# Patient Record
Sex: MALE | Race: WHITE | NOT HISPANIC OR LATINO
[De-identification: names, ages, dates, MRNs, and addresses within clinical notes are randomized per-mention and may not be internally consistent; named-entity substitution may affect disease eponyms.]

---

## 2017-03-06 PROBLEM — Z00.00 ENCOUNTER FOR PREVENTIVE HEALTH EXAMINATION: Status: ACTIVE | Noted: 2017-03-06

## 2017-03-13 ENCOUNTER — APPOINTMENT (OUTPATIENT)
Dept: SURGERY | Facility: CLINIC | Age: 82
End: 2017-03-13

## 2017-03-27 ENCOUNTER — APPOINTMENT (OUTPATIENT)
Dept: SURGERY | Facility: CLINIC | Age: 82
End: 2017-03-27

## 2017-04-03 ENCOUNTER — APPOINTMENT (OUTPATIENT)
Dept: SURGERY | Facility: CLINIC | Age: 82
End: 2017-04-03

## 2017-04-10 ENCOUNTER — APPOINTMENT (OUTPATIENT)
Dept: SURGERY | Facility: CLINIC | Age: 82
End: 2017-04-10

## 2017-06-07 ENCOUNTER — OUTPATIENT (OUTPATIENT)
Dept: OUTPATIENT SERVICES | Facility: HOSPITAL | Age: 82
LOS: 1 days | Discharge: HOME | End: 2017-06-07

## 2017-06-07 DIAGNOSIS — K63.1 PERFORATION OF INTESTINE (NONTRAUMATIC): ICD-10-CM

## 2017-06-07 DIAGNOSIS — R42 DIZZINESS AND GIDDINESS: ICD-10-CM

## 2017-06-07 DIAGNOSIS — K21.9 GASTRO-ESOPHAGEAL REFLUX DISEASE WITHOUT ESOPHAGITIS: ICD-10-CM

## 2017-06-07 DIAGNOSIS — N17.9 ACUTE KIDNEY FAILURE, UNSPECIFIED: ICD-10-CM

## 2017-06-07 DIAGNOSIS — N18.3 CHRONIC KIDNEY DISEASE, STAGE 3 (MODERATE): ICD-10-CM

## 2017-06-07 DIAGNOSIS — I25.10 ATHEROSCLEROTIC HEART DISEASE OF NATIVE CORONARY ARTERY WITHOUT ANGINA PECTORIS: ICD-10-CM

## 2017-06-07 DIAGNOSIS — S72.009A FRACTURE OF UNSPECIFIED PART OF NECK OF UNSPECIFIED FEMUR, INITIAL ENCOUNTER FOR CLOSED FRACTURE: ICD-10-CM

## 2017-06-07 DIAGNOSIS — I71.4 ABDOMINAL AORTIC ANEURYSM, WITHOUT RUPTURE: ICD-10-CM

## 2017-06-07 DIAGNOSIS — C61 MALIGNANT NEOPLASM OF PROSTATE: ICD-10-CM

## 2017-06-07 DIAGNOSIS — C18.9 MALIGNANT NEOPLASM OF COLON, UNSPECIFIED: ICD-10-CM
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
## 2017-06-07 DIAGNOSIS — Z93.3 COLOSTOMY STATUS: ICD-10-CM

## 2017-06-07 DIAGNOSIS — R31.0 GROSS HEMATURIA: ICD-10-CM

## 2017-06-07 DIAGNOSIS — D64.9 ANEMIA, UNSPECIFIED: ICD-10-CM

## 2017-06-07 DIAGNOSIS — K57.32 DIVERTICULITIS OF LARGE INTESTINE WITHOUT PERFORATION OR ABSCESS WITHOUT BLEEDING: ICD-10-CM

## 2017-06-28 DIAGNOSIS — N39.0 URINARY TRACT INFECTION, SITE NOT SPECIFIED: ICD-10-CM

## 2017-07-18 ENCOUNTER — OUTPATIENT (OUTPATIENT)
Dept: OUTPATIENT SERVICES | Facility: HOSPITAL | Age: 82
LOS: 1 days | Discharge: HOME | End: 2017-07-18

## 2017-07-18 DIAGNOSIS — C61 MALIGNANT NEOPLASM OF PROSTATE: ICD-10-CM

## 2017-07-18 DIAGNOSIS — E78.00 PURE HYPERCHOLESTEROLEMIA, UNSPECIFIED: ICD-10-CM

## 2017-07-18 DIAGNOSIS — E64.9 SEQUELAE OF UNSPECIFIED NUTRITIONAL DEFICIENCY: ICD-10-CM

## 2017-07-18 DIAGNOSIS — R42 DIZZINESS AND GIDDINESS: ICD-10-CM

## 2017-07-18 DIAGNOSIS — N18.4 CHRONIC KIDNEY DISEASE, STAGE 4 (SEVERE): ICD-10-CM

## 2017-07-18 DIAGNOSIS — K57.32 DIVERTICULITIS OF LARGE INTESTINE WITHOUT PERFORATION OR ABSCESS WITHOUT BLEEDING: ICD-10-CM

## 2017-07-18 DIAGNOSIS — D64.9 ANEMIA, UNSPECIFIED: ICD-10-CM

## 2017-07-18 DIAGNOSIS — K21.9 GASTRO-ESOPHAGEAL REFLUX DISEASE WITHOUT ESOPHAGITIS: ICD-10-CM

## 2017-07-18 DIAGNOSIS — K63.1 PERFORATION OF INTESTINE (NONTRAUMATIC): ICD-10-CM

## 2017-07-18 DIAGNOSIS — E83.52 HYPERCALCEMIA: ICD-10-CM

## 2017-07-18 DIAGNOSIS — N17.9 ACUTE KIDNEY FAILURE, UNSPECIFIED: ICD-10-CM

## 2017-07-18 DIAGNOSIS — I25.10 ATHEROSCLEROTIC HEART DISEASE OF NATIVE CORONARY ARTERY WITHOUT ANGINA PECTORIS: ICD-10-CM

## 2017-07-18 DIAGNOSIS — R31.0 GROSS HEMATURIA: ICD-10-CM

## 2017-07-18 DIAGNOSIS — C18.9 MALIGNANT NEOPLASM OF COLON, UNSPECIFIED: ICD-10-CM

## 2017-07-18 DIAGNOSIS — I71.4 ABDOMINAL AORTIC ANEURYSM, WITHOUT RUPTURE: ICD-10-CM

## 2017-07-18 DIAGNOSIS — N18.3 CHRONIC KIDNEY DISEASE, STAGE 3 (MODERATE): ICD-10-CM

## 2017-07-18 DIAGNOSIS — Z93.3 COLOSTOMY STATUS: ICD-10-CM

## 2017-07-18 DIAGNOSIS — S72.009A FRACTURE OF UNSPECIFIED PART OF NECK OF UNSPECIFIED FEMUR, INITIAL ENCOUNTER FOR CLOSED FRACTURE: ICD-10-CM
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
## 2017-08-31 ENCOUNTER — APPOINTMENT (OUTPATIENT)
Dept: SURGERY | Facility: CLINIC | Age: 82
End: 2017-08-31
Payer: MEDICARE

## 2017-08-31 VITALS — HEIGHT: 70 IN | WEIGHT: 125 LBS | BODY MASS INDEX: 17.9 KG/M2

## 2017-08-31 DIAGNOSIS — K43.2 INCISIONAL HERNIA W/OUT OBSTRUCTION OR GANGRENE: ICD-10-CM

## 2017-08-31 DIAGNOSIS — K63.2 FISTULA OF INTESTINE: ICD-10-CM

## 2017-08-31 DIAGNOSIS — K43.5 PARASTOMAL HERNIA WITHOUT OBSTRUCTION OR GANGRENE: ICD-10-CM

## 2017-08-31 PROCEDURE — 99214 OFFICE O/P EST MOD 30 MIN: CPT

## 2017-11-17 ENCOUNTER — INPATIENT (INPATIENT)
Facility: HOSPITAL | Age: 82
LOS: 0 days | Discharge: HOME | End: 2017-11-17
Attending: EMERGENCY MEDICINE | Admitting: FAMILY MEDICINE

## 2017-11-17 DIAGNOSIS — Z93.3 COLOSTOMY STATUS: ICD-10-CM

## 2017-11-17 DIAGNOSIS — N39.0 URINARY TRACT INFECTION, SITE NOT SPECIFIED: ICD-10-CM

## 2017-11-17 DIAGNOSIS — I71.4 ABDOMINAL AORTIC ANEURYSM, WITHOUT RUPTURE: ICD-10-CM
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
## 2017-11-17 DIAGNOSIS — N18.9 CHRONIC KIDNEY DISEASE, UNSPECIFIED: ICD-10-CM

## 2017-11-17 DIAGNOSIS — C61 MALIGNANT NEOPLASM OF PROSTATE: ICD-10-CM

## 2017-11-17 DIAGNOSIS — C18.9 MALIGNANT NEOPLASM OF COLON, UNSPECIFIED: ICD-10-CM

## 2017-11-17 DIAGNOSIS — N17.9 ACUTE KIDNEY FAILURE, UNSPECIFIED: ICD-10-CM

## 2017-11-17 DIAGNOSIS — K57.32 DIVERTICULITIS OF LARGE INTESTINE WITHOUT PERFORATION OR ABSCESS WITHOUT BLEEDING: ICD-10-CM

## 2017-11-17 DIAGNOSIS — S72.009A FRACTURE OF UNSPECIFIED PART OF NECK OF UNSPECIFIED FEMUR, INITIAL ENCOUNTER FOR CLOSED FRACTURE: ICD-10-CM

## 2017-11-17 DIAGNOSIS — R31.9 HEMATURIA, UNSPECIFIED: ICD-10-CM

## 2017-11-17 DIAGNOSIS — R42 DIZZINESS AND GIDDINESS: ICD-10-CM

## 2017-11-17 DIAGNOSIS — N18.3 CHRONIC KIDNEY DISEASE, STAGE 3 (MODERATE): ICD-10-CM

## 2017-11-17 DIAGNOSIS — R31.0 GROSS HEMATURIA: ICD-10-CM

## 2017-11-17 DIAGNOSIS — K21.9 GASTRO-ESOPHAGEAL REFLUX DISEASE WITHOUT ESOPHAGITIS: ICD-10-CM

## 2017-11-17 DIAGNOSIS — D64.9 ANEMIA, UNSPECIFIED: ICD-10-CM

## 2017-11-17 DIAGNOSIS — I25.10 ATHEROSCLEROTIC HEART DISEASE OF NATIVE CORONARY ARTERY WITHOUT ANGINA PECTORIS: ICD-10-CM

## 2017-11-17 DIAGNOSIS — K63.1 PERFORATION OF INTESTINE (NONTRAUMATIC): ICD-10-CM

## 2017-11-21 ENCOUNTER — INPATIENT (INPATIENT)
Facility: HOSPITAL | Age: 82
LOS: 7 days | Discharge: NURSING, REHAB OR LONG-TERM CARE | End: 2017-11-29
Attending: INTERNAL MEDICINE
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
## 2017-11-21 DIAGNOSIS — D64.9 ANEMIA, UNSPECIFIED: ICD-10-CM

## 2017-11-21 DIAGNOSIS — N18.3 CHRONIC KIDNEY DISEASE, STAGE 3 (MODERATE): ICD-10-CM

## 2017-11-21 DIAGNOSIS — S72.009A FRACTURE OF UNSPECIFIED PART OF NECK OF UNSPECIFIED FEMUR, INITIAL ENCOUNTER FOR CLOSED FRACTURE: ICD-10-CM

## 2017-11-21 DIAGNOSIS — C18.9 MALIGNANT NEOPLASM OF COLON, UNSPECIFIED: ICD-10-CM

## 2017-11-21 DIAGNOSIS — K63.1 PERFORATION OF INTESTINE (NONTRAUMATIC): ICD-10-CM

## 2017-11-21 DIAGNOSIS — R31.0 GROSS HEMATURIA: ICD-10-CM

## 2017-11-21 DIAGNOSIS — C61 MALIGNANT NEOPLASM OF PROSTATE: ICD-10-CM

## 2017-11-21 DIAGNOSIS — K21.9 GASTRO-ESOPHAGEAL REFLUX DISEASE WITHOUT ESOPHAGITIS: ICD-10-CM

## 2017-11-21 DIAGNOSIS — K57.32 DIVERTICULITIS OF LARGE INTESTINE WITHOUT PERFORATION OR ABSCESS WITHOUT BLEEDING: ICD-10-CM

## 2017-11-21 DIAGNOSIS — I25.10 ATHEROSCLEROTIC HEART DISEASE OF NATIVE CORONARY ARTERY WITHOUT ANGINA PECTORIS: ICD-10-CM

## 2017-11-21 DIAGNOSIS — R42 DIZZINESS AND GIDDINESS: ICD-10-CM

## 2017-11-21 DIAGNOSIS — N17.9 ACUTE KIDNEY FAILURE, UNSPECIFIED: ICD-10-CM

## 2017-11-21 DIAGNOSIS — Z93.3 COLOSTOMY STATUS: ICD-10-CM

## 2017-11-21 DIAGNOSIS — I71.4 ABDOMINAL AORTIC ANEURYSM, WITHOUT RUPTURE: ICD-10-CM

## 2017-11-30 ENCOUNTER — OUTPATIENT (OUTPATIENT)
Dept: OUTPATIENT SERVICES | Facility: HOSPITAL | Age: 82
LOS: 1 days | Discharge: HOME | End: 2017-11-30

## 2017-11-30 DIAGNOSIS — K21.9 GASTRO-ESOPHAGEAL REFLUX DISEASE WITHOUT ESOPHAGITIS: ICD-10-CM

## 2017-11-30 DIAGNOSIS — I71.4 ABDOMINAL AORTIC ANEURYSM, WITHOUT RUPTURE: ICD-10-CM

## 2017-11-30 DIAGNOSIS — I25.10 ATHEROSCLEROTIC HEART DISEASE OF NATIVE CORONARY ARTERY WITHOUT ANGINA PECTORIS: ICD-10-CM
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
## 2017-11-30 DIAGNOSIS — R42 DIZZINESS AND GIDDINESS: ICD-10-CM

## 2017-11-30 DIAGNOSIS — Z93.3 COLOSTOMY STATUS: ICD-10-CM

## 2017-11-30 DIAGNOSIS — K57.32 DIVERTICULITIS OF LARGE INTESTINE WITHOUT PERFORATION OR ABSCESS WITHOUT BLEEDING: ICD-10-CM

## 2017-11-30 DIAGNOSIS — D64.9 ANEMIA, UNSPECIFIED: ICD-10-CM

## 2017-11-30 DIAGNOSIS — N17.9 ACUTE KIDNEY FAILURE, UNSPECIFIED: ICD-10-CM

## 2017-11-30 DIAGNOSIS — C61 MALIGNANT NEOPLASM OF PROSTATE: ICD-10-CM

## 2017-11-30 DIAGNOSIS — N18.3 CHRONIC KIDNEY DISEASE, STAGE 3 (MODERATE): ICD-10-CM

## 2017-11-30 DIAGNOSIS — K63.1 PERFORATION OF INTESTINE (NONTRAUMATIC): ICD-10-CM

## 2017-11-30 DIAGNOSIS — R31.0 GROSS HEMATURIA: ICD-10-CM

## 2017-11-30 DIAGNOSIS — C18.9 MALIGNANT NEOPLASM OF COLON, UNSPECIFIED: ICD-10-CM

## 2017-11-30 DIAGNOSIS — S72.009A FRACTURE OF UNSPECIFIED PART OF NECK OF UNSPECIFIED FEMUR, INITIAL ENCOUNTER FOR CLOSED FRACTURE: ICD-10-CM

## 2017-12-01 ENCOUNTER — OUTPATIENT (OUTPATIENT)
Dept: OUTPATIENT SERVICES | Facility: HOSPITAL | Age: 82
LOS: 1 days | Discharge: HOME | End: 2017-12-01

## 2017-12-01 DIAGNOSIS — Z93.3 COLOSTOMY STATUS: ICD-10-CM

## 2017-12-01 DIAGNOSIS — R42 DIZZINESS AND GIDDINESS: ICD-10-CM

## 2017-12-01 DIAGNOSIS — C18.9 MALIGNANT NEOPLASM OF COLON, UNSPECIFIED: ICD-10-CM

## 2017-12-01 DIAGNOSIS — C61 MALIGNANT NEOPLASM OF PROSTATE: ICD-10-CM

## 2017-12-01 DIAGNOSIS — R31.0 GROSS HEMATURIA: ICD-10-CM

## 2017-12-01 DIAGNOSIS — S72.009A FRACTURE OF UNSPECIFIED PART OF NECK OF UNSPECIFIED FEMUR, INITIAL ENCOUNTER FOR CLOSED FRACTURE: ICD-10-CM

## 2017-12-01 DIAGNOSIS — R30.0 DYSURIA: ICD-10-CM

## 2017-12-01 DIAGNOSIS — N18.3 CHRONIC KIDNEY DISEASE, STAGE 3 (MODERATE): ICD-10-CM

## 2017-12-01 DIAGNOSIS — I71.4 ABDOMINAL AORTIC ANEURYSM, WITHOUT RUPTURE: ICD-10-CM
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
## 2017-12-01 DIAGNOSIS — K57.32 DIVERTICULITIS OF LARGE INTESTINE WITHOUT PERFORATION OR ABSCESS WITHOUT BLEEDING: ICD-10-CM

## 2017-12-01 DIAGNOSIS — K63.1 PERFORATION OF INTESTINE (NONTRAUMATIC): ICD-10-CM

## 2017-12-01 DIAGNOSIS — I25.10 ATHEROSCLEROTIC HEART DISEASE OF NATIVE CORONARY ARTERY WITHOUT ANGINA PECTORIS: ICD-10-CM

## 2017-12-01 DIAGNOSIS — D64.9 ANEMIA, UNSPECIFIED: ICD-10-CM

## 2017-12-01 DIAGNOSIS — N17.9 ACUTE KIDNEY FAILURE, UNSPECIFIED: ICD-10-CM

## 2017-12-01 DIAGNOSIS — K21.9 GASTRO-ESOPHAGEAL REFLUX DISEASE WITHOUT ESOPHAGITIS: ICD-10-CM

## 2017-12-04 ENCOUNTER — OUTPATIENT (OUTPATIENT)
Dept: OUTPATIENT SERVICES | Facility: HOSPITAL | Age: 82
LOS: 1 days | Discharge: HOME | End: 2017-12-04

## 2017-12-04 DIAGNOSIS — N39.0 URINARY TRACT INFECTION, SITE NOT SPECIFIED: ICD-10-CM

## 2017-12-04 DIAGNOSIS — D64.9 ANEMIA, UNSPECIFIED: ICD-10-CM

## 2017-12-04 DIAGNOSIS — R31.0 GROSS HEMATURIA: ICD-10-CM

## 2017-12-04 DIAGNOSIS — Z86.718 PERSONAL HISTORY OF OTHER VENOUS THROMBOSIS AND EMBOLISM: ICD-10-CM

## 2017-12-04 DIAGNOSIS — K57.32 DIVERTICULITIS OF LARGE INTESTINE WITHOUT PERFORATION OR ABSCESS WITHOUT BLEEDING: ICD-10-CM

## 2017-12-04 DIAGNOSIS — N17.9 ACUTE KIDNEY FAILURE, UNSPECIFIED: ICD-10-CM

## 2017-12-04 DIAGNOSIS — R42 DIZZINESS AND GIDDINESS: ICD-10-CM

## 2017-12-04 DIAGNOSIS — N18.4 CHRONIC KIDNEY DISEASE, STAGE 4 (SEVERE): ICD-10-CM

## 2017-12-04 DIAGNOSIS — I25.10 ATHEROSCLEROTIC HEART DISEASE OF NATIVE CORONARY ARTERY WITHOUT ANGINA PECTORIS: ICD-10-CM

## 2017-12-04 DIAGNOSIS — Z93.3 COLOSTOMY STATUS: ICD-10-CM

## 2017-12-04 DIAGNOSIS — N40.1 BENIGN PROSTATIC HYPERPLASIA WITH LOWER URINARY TRACT SYMPTOMS: ICD-10-CM

## 2017-12-04 DIAGNOSIS — S72.009A FRACTURE OF UNSPECIFIED PART OF NECK OF UNSPECIFIED FEMUR, INITIAL ENCOUNTER FOR CLOSED FRACTURE: ICD-10-CM

## 2017-12-04 DIAGNOSIS — E87.1 HYPO-OSMOLALITY AND HYPONATREMIA: ICD-10-CM

## 2017-12-04 DIAGNOSIS — Z85.46 PERSONAL HISTORY OF MALIGNANT NEOPLASM OF PROSTATE: ICD-10-CM

## 2017-12-04 DIAGNOSIS — R79.89 OTHER SPECIFIED ABNORMAL FINDINGS OF BLOOD CHEMISTRY: ICD-10-CM

## 2017-12-04 DIAGNOSIS — N18.3 CHRONIC KIDNEY DISEASE, STAGE 3 (MODERATE): ICD-10-CM

## 2017-12-04 DIAGNOSIS — K21.9 GASTRO-ESOPHAGEAL REFLUX DISEASE WITHOUT ESOPHAGITIS: ICD-10-CM

## 2017-12-04 DIAGNOSIS — I12.9 HYPERTENSIVE CHRONIC KIDNEY DISEASE WITH STAGE 1 THROUGH STAGE 4 CHRONIC KIDNEY DISEASE, OR UNSPECIFIED CHRONIC KIDNEY DISEASE: ICD-10-CM

## 2017-12-04 DIAGNOSIS — C18.9 MALIGNANT NEOPLASM OF COLON, UNSPECIFIED: ICD-10-CM

## 2017-12-04 DIAGNOSIS — I71.4 ABDOMINAL AORTIC ANEURYSM, WITHOUT RUPTURE: ICD-10-CM

## 2017-12-04 DIAGNOSIS — Z98.890 OTHER SPECIFIED POSTPROCEDURAL STATES: ICD-10-CM

## 2017-12-04 DIAGNOSIS — E78.5 HYPERLIPIDEMIA, UNSPECIFIED: ICD-10-CM

## 2017-12-04 DIAGNOSIS — K63.1 PERFORATION OF INTESTINE (NONTRAUMATIC): ICD-10-CM
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
## 2017-12-04 DIAGNOSIS — R33.8 OTHER RETENTION OF URINE: ICD-10-CM

## 2017-12-04 DIAGNOSIS — Z85.038 PERSONAL HISTORY OF OTHER MALIGNANT NEOPLASM OF LARGE INTESTINE: ICD-10-CM

## 2017-12-04 DIAGNOSIS — C61 MALIGNANT NEOPLASM OF PROSTATE: ICD-10-CM

## 2017-12-04 DIAGNOSIS — D63.1 ANEMIA IN CHRONIC KIDNEY DISEASE: ICD-10-CM

## 2017-12-07 DIAGNOSIS — K63.2 FISTULA OF INTESTINE: ICD-10-CM

## 2017-12-09 ENCOUNTER — OUTPATIENT (OUTPATIENT)
Dept: OUTPATIENT SERVICES | Facility: HOSPITAL | Age: 82
LOS: 1 days | Discharge: HOME | End: 2017-12-09

## 2017-12-09 DIAGNOSIS — K57.32 DIVERTICULITIS OF LARGE INTESTINE WITHOUT PERFORATION OR ABSCESS WITHOUT BLEEDING: ICD-10-CM

## 2017-12-09 DIAGNOSIS — R31.0 GROSS HEMATURIA: ICD-10-CM

## 2017-12-09 DIAGNOSIS — D64.9 ANEMIA, UNSPECIFIED: ICD-10-CM

## 2017-12-09 DIAGNOSIS — K63.1 PERFORATION OF INTESTINE (NONTRAUMATIC): ICD-10-CM

## 2017-12-09 DIAGNOSIS — K21.9 GASTRO-ESOPHAGEAL REFLUX DISEASE WITHOUT ESOPHAGITIS: ICD-10-CM

## 2017-12-09 DIAGNOSIS — C18.9 MALIGNANT NEOPLASM OF COLON, UNSPECIFIED: ICD-10-CM

## 2017-12-09 DIAGNOSIS — C61 MALIGNANT NEOPLASM OF PROSTATE: ICD-10-CM

## 2017-12-09 DIAGNOSIS — N17.9 ACUTE KIDNEY FAILURE, UNSPECIFIED: ICD-10-CM

## 2017-12-09 DIAGNOSIS — Z93.3 COLOSTOMY STATUS: ICD-10-CM

## 2017-12-09 DIAGNOSIS — R42 DIZZINESS AND GIDDINESS: ICD-10-CM

## 2017-12-09 DIAGNOSIS — N18.3 CHRONIC KIDNEY DISEASE, STAGE 3 (MODERATE): ICD-10-CM

## 2017-12-09 DIAGNOSIS — S72.009A FRACTURE OF UNSPECIFIED PART OF NECK OF UNSPECIFIED FEMUR, INITIAL ENCOUNTER FOR CLOSED FRACTURE: ICD-10-CM

## 2017-12-09 DIAGNOSIS — I71.4 ABDOMINAL AORTIC ANEURYSM, WITHOUT RUPTURE: ICD-10-CM
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
## 2017-12-09 DIAGNOSIS — I25.10 ATHEROSCLEROTIC HEART DISEASE OF NATIVE CORONARY ARTERY WITHOUT ANGINA PECTORIS: ICD-10-CM

## 2017-12-11 ENCOUNTER — OUTPATIENT (OUTPATIENT)
Dept: OUTPATIENT SERVICES | Facility: HOSPITAL | Age: 82
LOS: 1 days | Discharge: HOME | End: 2017-12-11

## 2017-12-11 DIAGNOSIS — C61 MALIGNANT NEOPLASM OF PROSTATE: ICD-10-CM

## 2017-12-11 DIAGNOSIS — R79.9 ABNORMAL FINDING OF BLOOD CHEMISTRY, UNSPECIFIED: ICD-10-CM

## 2017-12-11 DIAGNOSIS — S72.009A FRACTURE OF UNSPECIFIED PART OF NECK OF UNSPECIFIED FEMUR, INITIAL ENCOUNTER FOR CLOSED FRACTURE: ICD-10-CM

## 2017-12-11 DIAGNOSIS — D64.9 ANEMIA, UNSPECIFIED: ICD-10-CM

## 2017-12-11 DIAGNOSIS — R31.9 HEMATURIA, UNSPECIFIED: ICD-10-CM

## 2017-12-11 DIAGNOSIS — K63.1 PERFORATION OF INTESTINE (NONTRAUMATIC): ICD-10-CM

## 2017-12-11 DIAGNOSIS — R31.0 GROSS HEMATURIA: ICD-10-CM

## 2017-12-11 DIAGNOSIS — N18.3 CHRONIC KIDNEY DISEASE, STAGE 3 (MODERATE): ICD-10-CM

## 2017-12-11 DIAGNOSIS — Z93.3 COLOSTOMY STATUS: ICD-10-CM

## 2017-12-11 DIAGNOSIS — K57.32 DIVERTICULITIS OF LARGE INTESTINE WITHOUT PERFORATION OR ABSCESS WITHOUT BLEEDING: ICD-10-CM

## 2017-12-11 DIAGNOSIS — N17.9 ACUTE KIDNEY FAILURE, UNSPECIFIED: ICD-10-CM

## 2017-12-11 DIAGNOSIS — R30.0 DYSURIA: ICD-10-CM

## 2017-12-11 DIAGNOSIS — I71.4 ABDOMINAL AORTIC ANEURYSM, WITHOUT RUPTURE: ICD-10-CM

## 2017-12-11 DIAGNOSIS — K21.9 GASTRO-ESOPHAGEAL REFLUX DISEASE WITHOUT ESOPHAGITIS: ICD-10-CM

## 2017-12-11 DIAGNOSIS — R42 DIZZINESS AND GIDDINESS: ICD-10-CM
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
## 2017-12-11 DIAGNOSIS — C18.9 MALIGNANT NEOPLASM OF COLON, UNSPECIFIED: ICD-10-CM

## 2017-12-11 DIAGNOSIS — I25.10 ATHEROSCLEROTIC HEART DISEASE OF NATIVE CORONARY ARTERY WITHOUT ANGINA PECTORIS: ICD-10-CM

## 2017-12-12 ENCOUNTER — OUTPATIENT (OUTPATIENT)
Dept: OUTPATIENT SERVICES | Facility: HOSPITAL | Age: 82
LOS: 1 days | Discharge: HOME | End: 2017-12-12

## 2017-12-12 DIAGNOSIS — I25.10 ATHEROSCLEROTIC HEART DISEASE OF NATIVE CORONARY ARTERY WITHOUT ANGINA PECTORIS: ICD-10-CM

## 2017-12-12 DIAGNOSIS — N17.9 ACUTE KIDNEY FAILURE, UNSPECIFIED: ICD-10-CM

## 2017-12-12 DIAGNOSIS — K57.32 DIVERTICULITIS OF LARGE INTESTINE WITHOUT PERFORATION OR ABSCESS WITHOUT BLEEDING: ICD-10-CM

## 2017-12-12 DIAGNOSIS — R31.0 GROSS HEMATURIA: ICD-10-CM

## 2017-12-12 DIAGNOSIS — R79.9 ABNORMAL FINDING OF BLOOD CHEMISTRY, UNSPECIFIED: ICD-10-CM

## 2017-12-12 DIAGNOSIS — R42 DIZZINESS AND GIDDINESS: ICD-10-CM

## 2017-12-12 DIAGNOSIS — K63.1 PERFORATION OF INTESTINE (NONTRAUMATIC): ICD-10-CM

## 2017-12-12 DIAGNOSIS — Z93.3 COLOSTOMY STATUS: ICD-10-CM

## 2017-12-12 DIAGNOSIS — C18.9 MALIGNANT NEOPLASM OF COLON, UNSPECIFIED: ICD-10-CM
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
## 2017-12-12 DIAGNOSIS — R31.9 HEMATURIA, UNSPECIFIED: ICD-10-CM

## 2017-12-12 DIAGNOSIS — D64.9 ANEMIA, UNSPECIFIED: ICD-10-CM

## 2017-12-12 DIAGNOSIS — N18.3 CHRONIC KIDNEY DISEASE, STAGE 3 (MODERATE): ICD-10-CM

## 2017-12-12 DIAGNOSIS — K21.9 GASTRO-ESOPHAGEAL REFLUX DISEASE WITHOUT ESOPHAGITIS: ICD-10-CM

## 2017-12-12 DIAGNOSIS — S72.009A FRACTURE OF UNSPECIFIED PART OF NECK OF UNSPECIFIED FEMUR, INITIAL ENCOUNTER FOR CLOSED FRACTURE: ICD-10-CM

## 2017-12-12 DIAGNOSIS — I71.4 ABDOMINAL AORTIC ANEURYSM, WITHOUT RUPTURE: ICD-10-CM

## 2017-12-12 DIAGNOSIS — C61 MALIGNANT NEOPLASM OF PROSTATE: ICD-10-CM

## 2017-12-15 ENCOUNTER — OUTPATIENT (OUTPATIENT)
Dept: OUTPATIENT SERVICES | Facility: HOSPITAL | Age: 82
LOS: 1 days | Discharge: HOME | End: 2017-12-15

## 2017-12-15 DIAGNOSIS — C61 MALIGNANT NEOPLASM OF PROSTATE: ICD-10-CM
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
## 2017-12-15 DIAGNOSIS — K57.32 DIVERTICULITIS OF LARGE INTESTINE WITHOUT PERFORATION OR ABSCESS WITHOUT BLEEDING: ICD-10-CM

## 2017-12-15 DIAGNOSIS — K63.1 PERFORATION OF INTESTINE (NONTRAUMATIC): ICD-10-CM

## 2017-12-15 DIAGNOSIS — D64.9 ANEMIA, UNSPECIFIED: ICD-10-CM

## 2017-12-15 DIAGNOSIS — N18.3 CHRONIC KIDNEY DISEASE, STAGE 3 (MODERATE): ICD-10-CM

## 2017-12-15 DIAGNOSIS — E87.5 HYPERKALEMIA: ICD-10-CM

## 2017-12-15 DIAGNOSIS — Z93.3 COLOSTOMY STATUS: ICD-10-CM

## 2017-12-15 DIAGNOSIS — R42 DIZZINESS AND GIDDINESS: ICD-10-CM

## 2017-12-15 DIAGNOSIS — K21.9 GASTRO-ESOPHAGEAL REFLUX DISEASE WITHOUT ESOPHAGITIS: ICD-10-CM

## 2017-12-15 DIAGNOSIS — N17.9 ACUTE KIDNEY FAILURE, UNSPECIFIED: ICD-10-CM

## 2017-12-15 DIAGNOSIS — R31.0 GROSS HEMATURIA: ICD-10-CM

## 2017-12-15 DIAGNOSIS — C18.9 MALIGNANT NEOPLASM OF COLON, UNSPECIFIED: ICD-10-CM

## 2017-12-15 DIAGNOSIS — I25.10 ATHEROSCLEROTIC HEART DISEASE OF NATIVE CORONARY ARTERY WITHOUT ANGINA PECTORIS: ICD-10-CM

## 2017-12-15 DIAGNOSIS — S72.009A FRACTURE OF UNSPECIFIED PART OF NECK OF UNSPECIFIED FEMUR, INITIAL ENCOUNTER FOR CLOSED FRACTURE: ICD-10-CM

## 2017-12-15 DIAGNOSIS — I71.4 ABDOMINAL AORTIC ANEURYSM, WITHOUT RUPTURE: ICD-10-CM

## 2017-12-18 ENCOUNTER — OUTPATIENT (OUTPATIENT)
Dept: OUTPATIENT SERVICES | Facility: HOSPITAL | Age: 82
LOS: 1 days | Discharge: HOME | End: 2017-12-18

## 2017-12-18 DIAGNOSIS — K57.32 DIVERTICULITIS OF LARGE INTESTINE WITHOUT PERFORATION OR ABSCESS WITHOUT BLEEDING: ICD-10-CM

## 2017-12-18 DIAGNOSIS — C61 MALIGNANT NEOPLASM OF PROSTATE: ICD-10-CM

## 2017-12-18 DIAGNOSIS — I25.10 ATHEROSCLEROTIC HEART DISEASE OF NATIVE CORONARY ARTERY WITHOUT ANGINA PECTORIS: ICD-10-CM

## 2017-12-18 DIAGNOSIS — E87.5 HYPERKALEMIA: ICD-10-CM

## 2017-12-18 DIAGNOSIS — K63.1 PERFORATION OF INTESTINE (NONTRAUMATIC): ICD-10-CM

## 2017-12-18 DIAGNOSIS — C18.9 MALIGNANT NEOPLASM OF COLON, UNSPECIFIED: ICD-10-CM

## 2017-12-18 DIAGNOSIS — K21.9 GASTRO-ESOPHAGEAL REFLUX DISEASE WITHOUT ESOPHAGITIS: ICD-10-CM

## 2017-12-18 DIAGNOSIS — R31.0 GROSS HEMATURIA: ICD-10-CM

## 2017-12-18 DIAGNOSIS — N18.3 CHRONIC KIDNEY DISEASE, STAGE 3 (MODERATE): ICD-10-CM

## 2017-12-18 DIAGNOSIS — D64.9 ANEMIA, UNSPECIFIED: ICD-10-CM

## 2017-12-18 DIAGNOSIS — Z93.3 COLOSTOMY STATUS: ICD-10-CM

## 2017-12-18 DIAGNOSIS — N17.9 ACUTE KIDNEY FAILURE, UNSPECIFIED: ICD-10-CM

## 2017-12-18 DIAGNOSIS — R42 DIZZINESS AND GIDDINESS: ICD-10-CM

## 2017-12-18 DIAGNOSIS — I71.4 ABDOMINAL AORTIC ANEURYSM, WITHOUT RUPTURE: ICD-10-CM
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
## 2017-12-18 DIAGNOSIS — S72.009A FRACTURE OF UNSPECIFIED PART OF NECK OF UNSPECIFIED FEMUR, INITIAL ENCOUNTER FOR CLOSED FRACTURE: ICD-10-CM

## 2018-01-15 ENCOUNTER — OUTPATIENT (OUTPATIENT)
Dept: OUTPATIENT SERVICES | Facility: HOSPITAL | Age: 83
LOS: 1 days | Discharge: HOME | End: 2018-01-15

## 2018-01-15 DIAGNOSIS — C18.9 MALIGNANT NEOPLASM OF COLON, UNSPECIFIED: ICD-10-CM

## 2018-01-15 DIAGNOSIS — Z93.3 COLOSTOMY STATUS: ICD-10-CM

## 2018-01-15 DIAGNOSIS — C61 MALIGNANT NEOPLASM OF PROSTATE: ICD-10-CM

## 2018-01-15 DIAGNOSIS — N18.3 CHRONIC KIDNEY DISEASE, STAGE 3 (MODERATE): ICD-10-CM
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
## 2018-01-15 DIAGNOSIS — I25.10 ATHEROSCLEROTIC HEART DISEASE OF NATIVE CORONARY ARTERY WITHOUT ANGINA PECTORIS: ICD-10-CM

## 2018-01-15 DIAGNOSIS — K57.32 DIVERTICULITIS OF LARGE INTESTINE WITHOUT PERFORATION OR ABSCESS WITHOUT BLEEDING: ICD-10-CM

## 2018-01-15 DIAGNOSIS — S72.009A FRACTURE OF UNSPECIFIED PART OF NECK OF UNSPECIFIED FEMUR, INITIAL ENCOUNTER FOR CLOSED FRACTURE: ICD-10-CM

## 2018-01-15 DIAGNOSIS — R31.0 GROSS HEMATURIA: ICD-10-CM

## 2018-01-15 DIAGNOSIS — R42 DIZZINESS AND GIDDINESS: ICD-10-CM

## 2018-01-15 DIAGNOSIS — K63.1 PERFORATION OF INTESTINE (NONTRAUMATIC): ICD-10-CM

## 2018-01-15 DIAGNOSIS — K21.9 GASTRO-ESOPHAGEAL REFLUX DISEASE WITHOUT ESOPHAGITIS: ICD-10-CM

## 2018-01-15 DIAGNOSIS — I71.4 ABDOMINAL AORTIC ANEURYSM, WITHOUT RUPTURE: ICD-10-CM

## 2018-01-15 DIAGNOSIS — N17.9 ACUTE KIDNEY FAILURE, UNSPECIFIED: ICD-10-CM

## 2018-01-15 DIAGNOSIS — R30.0 DYSURIA: ICD-10-CM

## 2018-01-15 DIAGNOSIS — D64.9 ANEMIA, UNSPECIFIED: ICD-10-CM

## 2018-05-09 ENCOUNTER — EMERGENCY (EMERGENCY)
Facility: HOSPITAL | Age: 83
LOS: 0 days | Discharge: HOME | End: 2018-05-09
Attending: EMERGENCY MEDICINE | Admitting: EMERGENCY MEDICINE

## 2018-05-09 VITALS
HEART RATE: 60 BPM | TEMPERATURE: 98 F | RESPIRATION RATE: 18 BRPM | SYSTOLIC BLOOD PRESSURE: 162 MMHG | OXYGEN SATURATION: 98 % | DIASTOLIC BLOOD PRESSURE: 70 MMHG

## 2018-05-09 VITALS
OXYGEN SATURATION: 97 % | TEMPERATURE: 96 F | DIASTOLIC BLOOD PRESSURE: 73 MMHG | SYSTOLIC BLOOD PRESSURE: 163 MMHG | WEIGHT: 119.93 LBS | HEART RATE: 57 BPM | RESPIRATION RATE: 19 BRPM
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
## 2018-05-09 DIAGNOSIS — Z93.3 COLOSTOMY STATUS: ICD-10-CM
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
## 2018-05-09 DIAGNOSIS — N34.2 OTHER URETHRITIS: ICD-10-CM

## 2018-05-09 DIAGNOSIS — S83.091A OTHER SUBLUXATION OF RIGHT PATELLA, INITIAL ENCOUNTER: ICD-10-CM

## 2018-05-09 DIAGNOSIS — T83.091A OTHER MECHANICAL COMPLICATION OF INDWELLING URETHRAL CATHETER, INITIAL ENCOUNTER: ICD-10-CM

## 2018-05-09 DIAGNOSIS — Z87.448 PERSONAL HISTORY OF OTHER DISEASES OF URINARY SYSTEM: ICD-10-CM

## 2018-05-09 DIAGNOSIS — Z79.899 OTHER LONG TERM (CURRENT) DRUG THERAPY: ICD-10-CM
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
## 2018-05-09 NOTE — ED PROVIDER NOTE - ATTENDING CONTRIBUTION TO CARE
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  qureshi adjusted by , now draining.  stable for d/c.  follow up with outpatient .

## 2018-05-09 NOTE — ED PROVIDER NOTE - NS ED ROS FT
Constitutional: no fever, chills   Respiratory: no cough, no shortness of breath, no h/o asthma or COPD.  Gastrointestinal: no nausea, vomiting or diarrhea. no abdominal pain.   : see hpi   Integumentary: no rash or skin changes. no edema  Neurological: no headache, no dizziness, no visual changes, no UE/LE weakness or paresthesias.

## 2018-05-09 NOTE — ED PROVIDER NOTE - OBJECTIVE STATEMENT
89 yo male with h/o renal insuffiencey, colostomy, indwelling catheter presents to the ED with his aide for qureshi catheter issues. Aide explains there has been no drainage from the catheter since 7 am this morning. Concern that qureshi was accidently pulled. + bleeding from urethral. Denies fever, chills, abdominal pain, N/V/D. Patient poor historian - unsure when qureshi was changed last.

## 2018-05-09 NOTE — ED PROVIDER NOTE - PROGRESS NOTE DETAILS
Case discussed with surgery PA for urology consult Urology PA adjusted and flushed qureshi with goof urine drainage. 500 ccs of blood tinged urine in qureshi bag.

## 2018-05-09 NOTE — CHART NOTE - NSCHARTNOTEFT_GEN_A_CORE
Called By ER PA and DR to evaluate pt for possible dislodged Qureshi catheter. As per pts Aide pt may have pulled on catheter this AM. Admits to some blood from penis and no urine output since 7AM.    Pt denies any SP pain but states he feels he wants to urinate.    On exam Abdomen with + SPT and fullness  Some hematuric urine in leg bag and in qureshi tubing  Meatus with chronic erosion and some red blood appears to be mixed with urine    Qureshi balloon deflated and catheter easily advanced slightly with positive crystal clear urine return. No hematuria, no clots    Pt states he feels relieved of feeling to void.    D/W ER PORTILLO Lopez

## 2018-05-09 NOTE — ED PROVIDER NOTE - PHYSICAL EXAMINATION
GENERAL:  well appearing, non-toxic male in no acute distress  SKIN: skin warm, pink and dry. MMM. no pallor  PULM: CTAB. Normal respiratory effort. No respiratory distress. No wheezes, stridor, rales or rhonchi. No retractions  CV: RRR, no M/R/G.   ABD: Soft, non-tender, non-distended. + colostomy.   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 no suprapubic tenderness. no rebound or guarding.   NEURO: A+Ox3, no sensory/motor deficits

## 2019-02-06 ENCOUNTER — INPATIENT (INPATIENT)
Facility: HOSPITAL | Age: 84
LOS: 8 days | Discharge: NURSING, REHAB OR LONG-TERM CARE | End: 2019-02-15
Attending: INTERNAL MEDICINE | Admitting: INTERNAL MEDICINE
Payer: MEDICARE

## 2019-02-06 VITALS
HEART RATE: 68 BPM | RESPIRATION RATE: 19 BRPM | SYSTOLIC BLOOD PRESSURE: 164 MMHG | DIASTOLIC BLOOD PRESSURE: 72 MMHG | TEMPERATURE: 97 F | OXYGEN SATURATION: 99 %
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
## 2019-02-06 DIAGNOSIS — R26.9 UNSPECIFIED ABNORMALITIES OF GAIT AND MOBILITY: ICD-10-CM

## 2019-02-06 DIAGNOSIS — K63.2 FISTULA OF INTESTINE: Chronic | ICD-10-CM

## 2019-02-06 DIAGNOSIS — D64.9 ANEMIA, UNSPECIFIED: ICD-10-CM

## 2019-02-06 DIAGNOSIS — N18.4 CHRONIC KIDNEY DISEASE, STAGE 4 (SEVERE): ICD-10-CM

## 2019-02-06 DIAGNOSIS — Z85.038 PERSONAL HISTORY OF OTHER MALIGNANT NEOPLASM OF LARGE INTESTINE: ICD-10-CM

## 2019-02-06 DIAGNOSIS — N30.90 CYSTITIS, UNSPECIFIED WITHOUT HEMATURIA: ICD-10-CM

## 2019-02-06 DIAGNOSIS — K43.5 PARASTOMAL HERNIA WITHOUT OBSTRUCTION OR GANGRENE: Chronic | ICD-10-CM

## 2019-02-06 DIAGNOSIS — E87.2 ACIDOSIS: ICD-10-CM

## 2019-02-06 DIAGNOSIS — E87.5 HYPERKALEMIA: ICD-10-CM

## 2019-02-06 DIAGNOSIS — Z95.5 PRESENCE OF CORONARY ANGIOPLASTY IMPLANT AND GRAFT: Chronic | ICD-10-CM

## 2019-02-06 DIAGNOSIS — T83.89XA OTHER SPECIFIED COMPLICATION OF GENITOURINARY PROSTHETIC DEVICES, IMPLANTS AND GRAFTS, INITIAL ENCOUNTER: ICD-10-CM

## 2019-02-06 DIAGNOSIS — I71.4 ABDOMINAL AORTIC ANEURYSM, WITHOUT RUPTURE: ICD-10-CM

## 2019-02-06 DIAGNOSIS — N40.1 BENIGN PROSTATIC HYPERPLASIA WITH LOWER URINARY TRACT SYMPTOMS: ICD-10-CM

## 2019-02-06 DIAGNOSIS — K63.2 FISTULA OF INTESTINE: ICD-10-CM

## 2019-02-06 DIAGNOSIS — Z75.1 PERSON AWAITING ADMISSION TO ADEQUATE FACILITY ELSEWHERE: ICD-10-CM

## 2019-02-06 DIAGNOSIS — Z85.46 PERSONAL HISTORY OF MALIGNANT NEOPLASM OF PROSTATE: ICD-10-CM
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
## 2019-02-06 DIAGNOSIS — Z95.5 PRESENCE OF CORONARY ANGIOPLASTY IMPLANT AND GRAFT: ICD-10-CM

## 2019-02-06 DIAGNOSIS — B96.89 OTHER SPECIFIED BACTERIAL AGENTS AS THE CAUSE OF DISEASES CLASSIFIED ELSEWHERE: ICD-10-CM

## 2019-02-06 DIAGNOSIS — N17.9 ACUTE KIDNEY FAILURE, UNSPECIFIED: ICD-10-CM

## 2019-02-06 DIAGNOSIS — K43.2 INCISIONAL HERNIA WITHOUT OBSTRUCTION OR GANGRENE: Chronic | ICD-10-CM

## 2019-02-06 DIAGNOSIS — E86.0 DEHYDRATION: ICD-10-CM

## 2019-02-06 DIAGNOSIS — H91.90 UNSPECIFIED HEARING LOSS, UNSPECIFIED EAR: ICD-10-CM

## 2019-02-06 DIAGNOSIS — R33.8 OTHER RETENTION OF URINE: ICD-10-CM

## 2019-02-06 DIAGNOSIS — Z93.3 COLOSTOMY STATUS: ICD-10-CM

## 2019-02-06 DIAGNOSIS — Z60.9 PROBLEM RELATED TO SOCIAL ENVIRONMENT, UNSPECIFIED: ICD-10-CM

## 2019-02-06 DIAGNOSIS — Z98.890 OTHER SPECIFIED POSTPROCEDURAL STATES: Chronic | ICD-10-CM

## 2019-02-06 DIAGNOSIS — F03.90 UNSPECIFIED DEMENTIA WITHOUT BEHAVIORAL DISTURBANCE: ICD-10-CM

## 2019-02-06 PROBLEM — N40.0 BENIGN PROSTATIC HYPERPLASIA WITHOUT LOWER URINARY TRACT SYMPTOMS: Chronic | Status: ACTIVE | Noted: 2018-05-09

## 2019-02-06 PROBLEM — Z43.3 ENCOUNTER FOR ATTENTION TO COLOSTOMY: Chronic | Status: ACTIVE | Noted: 2018-05-09

## 2019-02-06 LAB
ALBUMIN SERPL ELPH-MCNC: 3.4 G/DL — LOW (ref 3.5–5.2)
ALLERGY+IMMUNOLOGY DIAG STUDY NOTE: SIGNIFICANT CHANGE UP
ALP SERPL-CCNC: 94 U/L — SIGNIFICANT CHANGE UP (ref 30–115)
ALT FLD-CCNC: 15 U/L — SIGNIFICANT CHANGE UP (ref 0–41)
ANION GAP SERPL CALC-SCNC: 13 MMOL/L — SIGNIFICANT CHANGE UP (ref 7–14)
APPEARANCE UR: ABNORMAL
AST SERPL-CCNC: 21 U/L — SIGNIFICANT CHANGE UP (ref 0–41)
BACTERIA # UR AUTO: ABNORMAL /HPF
BASOPHILS # BLD AUTO: 0.04 K/UL — SIGNIFICANT CHANGE UP (ref 0–0.2)
BASOPHILS NFR BLD AUTO: 0.4 % — SIGNIFICANT CHANGE UP (ref 0–1)
BILIRUB SERPL-MCNC: 0.3 MG/DL — SIGNIFICANT CHANGE UP (ref 0.2–1.2)
BILIRUB UR-MCNC: NEGATIVE — SIGNIFICANT CHANGE UP
BUN SERPL-MCNC: 68 MG/DL — CRITICAL HIGH (ref 10–20)
CALCIUM SERPL-MCNC: 8.5 MG/DL — SIGNIFICANT CHANGE UP (ref 8.5–10.1)
CHLORIDE SERPL-SCNC: 110 MMOL/L — SIGNIFICANT CHANGE UP (ref 98–110)
CO2 SERPL-SCNC: 16 MMOL/L — LOW (ref 17–32)
COLOR SPEC: YELLOW — SIGNIFICANT CHANGE UP
CREAT SERPL-MCNC: 3.5 MG/DL — HIGH (ref 0.7–1.5)
DIFF PNL FLD: ABNORMAL
EOSINOPHIL # BLD AUTO: 0.06 K/UL — SIGNIFICANT CHANGE UP (ref 0–0.7)
EOSINOPHIL NFR BLD AUTO: 0.7 % — SIGNIFICANT CHANGE UP (ref 0–8)
EPI CELLS # UR: ABNORMAL /HPF
GLUCOSE SERPL-MCNC: 93 MG/DL — SIGNIFICANT CHANGE UP (ref 70–99)
GLUCOSE UR QL: NEGATIVE MG/DL — SIGNIFICANT CHANGE UP
HCT VFR BLD CALC: 33.1 % — LOW (ref 42–52)
HGB BLD-MCNC: 10.4 G/DL — LOW (ref 14–18)
IMM GRANULOCYTES NFR BLD AUTO: 0.6 % — HIGH (ref 0.1–0.3)
KETONES UR-MCNC: NEGATIVE — SIGNIFICANT CHANGE UP
LACTATE SERPL-SCNC: 0.8 MMOL/L — SIGNIFICANT CHANGE UP (ref 0.5–2.2)
LEUKOCYTE ESTERASE UR-ACNC: SIGNIFICANT CHANGE UP
LIDOCAIN IGE QN: 53 U/L — SIGNIFICANT CHANGE UP (ref 7–60)
LYMPHOCYTES # BLD AUTO: 1.73 K/UL — SIGNIFICANT CHANGE UP (ref 1.2–3.4)
LYMPHOCYTES # BLD AUTO: 19.4 % — LOW (ref 20.5–51.1)
MCHC RBC-ENTMCNC: 30.3 PG — SIGNIFICANT CHANGE UP (ref 27–31)
MCHC RBC-ENTMCNC: 31.4 G/DL — LOW (ref 32–37)
MCV RBC AUTO: 96.5 FL — HIGH (ref 80–94)
MONOCYTES # BLD AUTO: 0.8 K/UL — HIGH (ref 0.1–0.6)
MONOCYTES NFR BLD AUTO: 9 % — SIGNIFICANT CHANGE UP (ref 1.7–9.3)
NEUTROPHILS # BLD AUTO: 6.25 K/UL — SIGNIFICANT CHANGE UP (ref 1.4–6.5)
NEUTROPHILS NFR BLD AUTO: 69.9 % — SIGNIFICANT CHANGE UP (ref 42.2–75.2)
NITRITE UR-MCNC: POSITIVE
NRBC # BLD: 0 /100 WBCS — SIGNIFICANT CHANGE UP (ref 0–0)
PH UR: 6 — SIGNIFICANT CHANGE UP (ref 5–8)
PLATELET # BLD AUTO: 207 K/UL — SIGNIFICANT CHANGE UP (ref 130–400)
POTASSIUM SERPL-MCNC: 5.3 MMOL/L — HIGH (ref 3.5–5)
POTASSIUM SERPL-SCNC: 5.3 MMOL/L — HIGH (ref 3.5–5)
PROT SERPL-MCNC: 6.6 G/DL — SIGNIFICANT CHANGE UP (ref 6–8)
PROT UR-MCNC: 100 MG/DL
RBC # BLD: 3.43 M/UL — LOW (ref 4.7–6.1)
RBC # FLD: 14.4 % — SIGNIFICANT CHANGE UP (ref 11.5–14.5)
SODIUM SERPL-SCNC: 139 MMOL/L — SIGNIFICANT CHANGE UP (ref 135–146)
SP GR SPEC: 1.02 — SIGNIFICANT CHANGE UP (ref 1.01–1.03)
TROPONIN T SERPL-MCNC: <0.01 NG/ML — SIGNIFICANT CHANGE UP
TYPE + AB SCN PNL BLD: SIGNIFICANT CHANGE UP
UROBILINOGEN FLD QL: 0.2 MG/DL — SIGNIFICANT CHANGE UP (ref 0.2–0.2)
WBC # BLD: 8.93 K/UL — SIGNIFICANT CHANGE UP (ref 4.8–10.8)
WBC # FLD AUTO: 8.93 K/UL — SIGNIFICANT CHANGE UP (ref 4.8–10.8)
WBC UR QL: >50 /HPF

## 2019-02-06 RX ORDER — FINASTERIDE 5 MG/1
0 TABLET, FILM COATED ORAL
Qty: 0 | Refills: 0 | COMMUNITY

## 2019-02-06 RX ORDER — FERROUS SULFATE 325(65) MG
0 TABLET ORAL
Qty: 0 | Refills: 0 | COMMUNITY

## 2019-02-06 RX ORDER — TAMSULOSIN HYDROCHLORIDE 0.4 MG/1
1 CAPSULE ORAL
Qty: 0 | Refills: 0 | COMMUNITY

## 2019-02-06 RX ORDER — CALCITRIOL 0.5 UG/1
1 CAPSULE ORAL
Qty: 0 | Refills: 0 | COMMUNITY

## 2019-02-06 SDOH — SOCIAL STABILITY - SOCIAL INSECURITY: PROBLEM RELATED TO SOCIAL ENVIRONMENT, UNSPECIFIED: Z60.9

## 2019-02-06 NOTE — H&P ADULT - NSHPLABSRESULTS_GEN_ALL_CORE
< from: CT Abdomen and Pelvis No Cont (19 @ 16:50) >      EXAM:  CT ABDOMEN AND PELVIS          PROCEDURE DATE:  2019      IMPRESSION: Diffuse wall thickening and perivesicular fat stranding of   the urinary bladder, most consistent with cystitis. Correlate with   urinalysis.    Guillen catheter in place.    Stable appearance of postoperative changes of the large bowel and small   bowel, with left lower quadrant ostomy, and a parastomal hernia   containing nonobstructive small bowel loops.    Stable appearance of ventral hernia containing multiple loops of   nonobstructed small bowel.    Again noted post endograft repair of infrarenal abdominal aortic aneurysm   with interval further mild dilatation of the native aneurysm sac   measuring 5.7 cm (previously 5.2 cm in 2015).    DYAN RUSSO M.D., ATTENDING RADIOLOGIST  This document has been electronically signed. 2019  5:15PM      < end of copied text >                          10.4   8.93  )-----------( 207      ( 2019 11:45 )             33.1     -    139  |  110  |  68<HH>  ----------------------------<  93  5.3<H>   |  16<L>  |  3.5<H>    Ca    8.5      2019 11:45    TPro  6.6  /  Alb  3.4<L>  /  TBili  0.3  /  DBili  x   /  AST  21  /  ALT  15  /  AlkPhos  94  -          Urinalysis Basic - ( 2019 14:30 )    Color: Yellow / Appearance: Cloudy / S.020 / pH: x  Gluc: x / Ketone: Negative  / Bili: Negative / Urobili: 0.2 mg/dL   Blood: x / Protein: 100 mg/dL / Nitrite: Positive   Leuk Esterase: Large / RBC: x / WBC >50 /HPF   Sq Epi: x / Non Sq Epi: Occasional /HPF / Bacteria: TNTC /HPF        Lactate Trend   @ 11:45 Lactate:0.8     CARDIAC MARKERS ( 2019 11:45 )  x     / <0.01 ng/mL / x     / x     / x          CAPILLARY BLOOD GLUCOSE    EKG - accelerated junctional rhythm, LVH

## 2019-02-06 NOTE — ED PROVIDER NOTE - NS ED ROS FT
Constitutional: (-) fever (-) malaise (-) diaphoresis (-) chills  Eyes/ENT: (-) vision changes (-) blurry vision   Cardiovascular: (-) chest pain, (-) syncope (-) palpitations  Respiratory: (-) dry/productive cough, (-) shortness of breath   Gastrointestinal: (+) abdominal pain (+) colostomy bag (-) vomiting, (-) diarrhea  : (+) urinary retention  Musculoskeletal: (-) neck pain, (-) back pain  Integumentary: (+) abdominal wound (-) rash, (-) edema   Neurological: (-) headache, (-) altered mental status (-) LOC  (-) saddle anesthesia  Psychiatric: (-) hallucinations  Allergic/Immunologic: (-) pruritus

## 2019-02-06 NOTE — ED ADULT NURSE NOTE - NSIMPLEMENTINTERV_GEN_ALL_ED
Implemented All Fall with Harm Risk Interventions:  [place] to call system. Call bell, personal items and telephone within reach. Instruct patient to call for assistance. Room bathroom lighting operational. Non-slip footwear when patient is off stretcher. Physically safe environment: no spills, clutter or unnecessary equipment. Stretcher in lowest position, wheels locked, appropriate side rails in place. Provide visual cue, wrist band, yellow gown, etc. Monitor gait and stability. Monitor for mental status changes and reorient to person, place, and time. Review medications for side effects contributing to fall risk. Reinforce activity limits and safety measures with patient and family. Provide visual clues: red socks.

## 2019-02-06 NOTE — CONSULT NOTE ADULT - ASSESSMENT
ASSESSMENT:  89y Male history of sigmoid colon resection 2014 and left inguinal hernia repair complicated by enterocutaneous fistulas  and parastomal hernia, followed by Dr. Moraes, presents today with abdominal pain that has since resolved, not tender on my examination, with a functioning ostomy, skin excoriation was noted around the area of his enterocutaneous fistulas in the midline of his lower abdomen, and his parastomal hernia is reducible and non-tender. He had an abdominal CT scan which demonstrated no SBO, no intra-abdominal inflammatory process, and was stable since prior CT scan. he is cleared surgically and ca follow up outpatient in the surgeons office.     PLAN:   [facility]   F/U with surgeon in office  Dr. Wilver Moraes  [phone]  Have patient call to make an appointment  Referral for [facility] for wound care, ostomy nurse, as patient has skin excoriation from enterocutaneous fistulas    Above plan discussed with Dr. Moraes , patient, and ED team    --------------------------------------------------------------------------------------    02-06-19 @ 18:45

## 2019-02-06 NOTE — ED PROVIDER NOTE - PHYSICAL EXAMINATION
GENERAL: Well-nourished, Well-developed. NAD.  HEAD: No visible or palpable bumps or hematomas. No signs of trauma. No ecchymosis behind ears B/L.  Eyes: PERRLA, EOMI. No asymmetry. No nystagmus.   ENMT: MMM. No pharyngeal erythema or exudates. Uvula midline.  Neck: Supple. No LAD. No cervical midline TTP. FROM  CVS: No reproducible chest wall tenderness. Normal S1,S2. No murmurs appreciated on auscultation   RESP: Chest rise symmetrical with good expansion. Lungs clear to auscultation B/L. No wheezing, rales, or rhonchi auscultated.  GI: +colostomy bag in place on left side of abdomen. Normal auscultation of bowel sounds in all 4 quadrants. Nontender, Nondistended. No guarding or rebound tenderness.   MSK: FROM of upper and lower extremities B/L.   Skin: + 2 cm open hole to center of abdomen with active drainage, foul odor, and surrounding erythema surrounding by large hernia.    EXT: Radial and pedal pulses present B/L.  Neuro: AA&O x 3. CNs II-XII grossly intact . Sensation grossly intact. Strength 5/5 B/L.   Psych: Appropriate mood and affect. Cooperative.  : Guillen in place.

## 2019-02-06 NOTE — H&P ADULT - HISTORY OF PRESENT ILLNESS
89y (patient is a poor historian due to dementia possibly aggravated by acute illness) 89y (patient is a poor historian due to dementia possibly aggravated by acute illness, no family at bedside) 89yo male with several chronic medical/surgical conditions which now includes draining enterocutaneous fistula, ostomy in place and chronic indwelling Qureshi due to urinary retention is sent to the ER because patient had been complaining of abdominal pain and he was not urinating. While in the ER his qureshi was changed and 700 ml urine obtained. (patient is a poor historian due to dementia possibly aggravated by acute illness, no family at bedside) 89yo male with several chronic medical/surgical conditions which now includes draining enterocutaneous fistula, ostomy in place (following surgical procedures) and chronic indwelling Qureshi due to urinary retention is sent to the ER because patient had been complaining of abdominal pain and he was not urinating. While in the ER his qureshi was changed and 700 ml urine obtained. Also was seen by surgical team, apparently reduced parastomal hernia with no further intervention needed (Patient providing little information but says he doesn't have any pain and he wants to drink water). ER staff spoke to patient's son who requested patient be seen for placement

## 2019-02-06 NOTE — ED PROVIDER NOTE - CARE PLAN
Principal Discharge DX:	Social problem  Secondary Diagnosis:	Abdominal pain Principal Discharge DX:	Abdominal fistula  Secondary Diagnosis:	Abdominal pain  Secondary Diagnosis:	Social problem

## 2019-02-06 NOTE — CONSULT NOTE ADULT - SUBJECTIVE AND OBJECTIVE BOX
Consultation Note  =====================================================    HPI:   89y Male sent from home complaining of abdominal pain that started 2 days ago. Patient with h/o dementia, unable to provide reliable hx. At the time of this patient encounter in the ED, the patient claims that he is not experiencing any pain and was hungry. Of note: he had urinary retention, chronic qureshi at home, qureshi was exchanged in the ED, 700mL urine drained. 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 ED consulted surgery prior to discharge given his extensive surgical hx.    PAST MEDICAL & SURGICAL HISTORY:  Enlarged prostate  Colostomy care    Home Meds: Home Medications:  ferrous sulfate 325 mg (65 mg elemental iron) oral tablet:  (2019 15:15)  finasteride:  (2019 15:15)  furosemide 20 mg oral tablet: 1 tab(s) orally once a day (2019 15:15)  tamsulosin 0.4 mg oral capsule: 1 cap(s) orally once a day (2019 15:15)    Allergies: Allergies  No Known Allergies  Intolerances    Soc:   Denies Tobacco/EtOH/Substance use  Advanced Directives: Presumed Full Code     ROS:    REVIEW OF SYSTEMS  [ ] A ten-point review of systems was otherwise negative except as noted.    --------------------------------------------------------------------------------------  VITAL SIGNS, INS/OUTS (last 24 hours):  --------------------------------------------------------------------------------------  ICU Vital Signs Last 24 Hrs  T(C): 35.8 (2019 17:27), Max: 36.1 (2019 14:58)  T(F): 96.5 (2019 17:27), Max: 97 (2019 14:58)  HR: 72 (2019 17:27) (68 - 78)  BP: 114/61 (2019 17:27) (113/57 - 164/72)  RR: 18 (2019 17:27) (18 - 19)  SpO2: 100% (2019 17:27) (99% - 100%)    I&O's Summary  2019 07:01  -  2019 18:45  --------------------------------------------------------  IN: 0 mL / OUT: 700 mL / NET: -700 mL    --------------------------------------------------------------------------------------  PHYSICAL EXAM  General: NAD, AAOx1, calm and cooperative  HEENT: NCAT, JOMAR, EOMI, Trachea ML, Neck supple  Cardiac: RRR S1, S2, no Murmurs, rubs or gallops  Respiratory: CTAB, normal respiratory effort, breath sounds equal BL, no wheeze, rhonchi or crackles  Abdomen: Soft, non-distended, non-tender, +bowel sounds, functioning, pink ostomy, reducible parastomal hernia  Skin: Warm/dry, skin excoriation over mid lower abdomen around     LABS  --------------------------------------------------------------------------------------  Labs:  CAPILLARY BLOOD GLUCOSE                        10.4   8.93  )-----------( 207      ( 2019 11:45 )             33.1       Auto Neutrophil %: 69.9 % (19 @ 11:45)  Auto Immature Granulocyte %: 0.6 % (19 @ 11:45)        139  |  110  |  68<HH>  ----------------------------<  93  5.3<H>   |  16<L>  |  3.5<H>      Calcium, Total Serum: 8.5 mg/dL (19 @ 11:45)      LFTs:             6.6  | 0.3  | 21       ------------------[94      ( 2019 11:45 )  3.4  | x    | 15          Lipase:53     Amylase:x         Lactate, Blood: 0.8 mmol/L (19 @ 11:45)    Coags: x    CARDIAC MARKERS ( 2019 11:45 )  x     / <0.01 ng/mL / x     / x     / x        Urinalysis Basic - ( 2019 14:30 )  Color: Yellow / Appearance: Cloudy / S.020 / pH: x  Gluc: x / Ketone: Negative  / Bili: Negative / Urobili: 0.2 mg/dL   Blood: x / Protein: 100 mg/dL / Nitrite: Positive   Leuk Esterase: Large / RBC: x / WBC >50 /HPF   Sq Epi: x / Non Sq Epi: Occasional /HPF / Bacteria: TNTC /HPF    --------------------------------------------------------------------------------------  IMAGING RESULTS  < from: CT Abdomen and Pelvis No Cont (02.06.19 @ 16:50) >  IMPRESSION: Diffuse wall thickening and perivesicular fat stranding of   the urinary bladder, most consistent with cystitis. Correlate with   urinalysis.  Qureshi catheter in place.  Stable appearance of postoperative changes of the large bowel and small   bowel, with left lower quadrant ostomy, and a parastomal hernia   containing nonobstructive small bowel loops.  Stable appearance of ventral hernia containing multiple loops of   nonobstructed small bowel.  Again noted post endograft repair of infrarenal abdominal aortic aneurysm   with interval further mild dilatation of the native aneurysm sac   < end of copied text >    --------------------------------------------------------------------------------------- Consultation Note  =====================================================    HPI:   89y Male sent from home complaining of abdominal pain that started 2 days ago. Patient with h/o dementia, unable to provide reliable hx. At the time of this patient encounter in the ED, the patient claims that he is not experiencing any pain and was hungry. Of note: he had urinary retention, chronic qureshi at home, qureshi was exchanged in the ED, 700mL urine drained. 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 ED consulted surgery prior to discharge given his extensive surgical hx.    PAST MEDICAL & SURGICAL HISTORY:  Enlarged prostate  Colostomy care    Home Meds: Home Medications:  ferrous sulfate 325 mg (65 mg elemental iron) oral tablet:  (2019 15:15)  finasteride:  (2019 15:15)  furosemide 20 mg oral tablet: 1 tab(s) orally once a day (2019 15:15)  tamsulosin 0.4 mg oral capsule: 1 cap(s) orally once a day (2019 15:15)    Allergies: Allergies  No Known Allergies  Intolerances    Soc:   Denies Tobacco/EtOH/Substance use  Advanced Directives: Presumed Full Code     ROS:    REVIEW OF SYSTEMS  [ ] A ten-point review of systems was otherwise negative except as noted.    --------------------------------------------------------------------------------------  VITAL SIGNS, INS/OUTS (last 24 hours):  --------------------------------------------------------------------------------------  ICU Vital Signs Last 24 Hrs  T(C): 35.8 (2019 17:27), Max: 36.1 (2019 14:58)  T(F): 96.5 (2019 17:27), Max: 97 (2019 14:58)  HR: 72 (2019 17:27) (68 - 78)  BP: 114/61 (2019 17:27) (113/57 - 164/72)  RR: 18 (2019 17:27) (18 - 19)  SpO2: 100% (2019 17:27) (99% - 100%)    I&O's Summary  2019 07:01  -  2019 18:45  --------------------------------------------------------  IN: 0 mL / OUT: 700 mL / NET: -700 mL    --------------------------------------------------------------------------------------  PHYSICAL EXAM  General: NAD, AAOx1, calm and cooperative  HEENT: NCAT, JOMAR, EOMI, Trachea ML, Neck supple  Cardiac: RRR S1, S2, no Murmurs, rubs or gallops  Respiratory: CTAB, normal respiratory effort, breath sounds equal BL, no wheeze, rhonchi or crackles  Abdomen: Soft, non-distended, non-tender, +bowel sounds, functioning, pink ostomy, reducible  incisional / parastomal hernia  Skin: Warm/dry, skin excoriation over mid abdomen around the enteric fistula with greenish drainage. Ulcer area measures 2.5cm X 1cm in the upper left corner of grafted area of abdomen.    LABS  --------------------------------------------------------------------------------------  Labs:  CAPILLARY BLOOD GLUCOSE                        10.4   8.93  )-----------( 207      ( 2019 11:45 )             33.1       Auto Neutrophil %: 69.9 % (19 @ 11:45)  Auto Immature Granulocyte %: 0.6 % (19 @ 11:45)        139  |  110  |  68<HH>  ----------------------------<  93  5.3<H>   |  16<L>  |  3.5<H>      Calcium, Total Serum: 8.5 mg/dL (19 @ 11:45)      LFTs:             6.6  | 0.3  | 21       ------------------[94      ( 2019 11:45 )  3.4  | x    | 15          Lipase:53     Amylase:x         Lactate, Blood: 0.8 mmol/L (19 @ 11:45)    Coags: x    CARDIAC MARKERS ( 2019 11:45 )  x     / <0.01 ng/mL / x     / x     / x        Urinalysis Basic - ( 2019 14:30 )  Color: Yellow / Appearance: Cloudy / S.020 / pH: x  Gluc: x / Ketone: Negative  / Bili: Negative / Urobili: 0.2 mg/dL   Blood: x / Protein: 100 mg/dL / Nitrite: Positive   Leuk Esterase: Large / RBC: x / WBC >50 /HPF   Sq Epi: x / Non Sq Epi: Occasional /HPF / Bacteria: TNTC /HPF    --------------------------------------------------------------------------------------  IMAGING RESULTS  < from: CT Abdomen and Pelvis No Cont (19 @ 16:50) >  IMPRESSION: Diffuse wall thickening and perivesicular fat stranding of   the urinary bladder, most consistent with cystitis. Correlate with   urinalysis.  Qureshi catheter in place.  Stable appearance of postoperative changes of the large bowel and small   bowel, with left lower quadrant ostomy, and a parastomal hernia   containing nonobstructive small bowel loops.  Stable appearance of ventral hernia containing multiple loops of   nonobstructed small bowel.  Again noted post endograft repair of infrarenal abdominal aortic aneurysm   with interval further mild dilatation of the native aneurysm sac   < end of copied text >    ---------------------------------------------------------------------------------------

## 2019-02-06 NOTE — H&P ADULT - PMH
CKD (chronic kidney disease)    Colon cancer    Colostomy care    Dementia    Enlarged prostate    JOSE (iron deficiency anemia)    Prostate cancer

## 2019-02-06 NOTE — ED PROVIDER NOTE - PSH
Abdominal fistula    H/O exploratory laparotomy    History of coronary artery stent placement    Incisional hernia    Parastomal hernia

## 2019-02-06 NOTE — ED PROVIDER NOTE - PMH
Colostomy care    Enlarged prostate CKD (chronic kidney disease)    Colon cancer    Colostomy care    Dementia    Enlarged prostate    JOSE (iron deficiency anemia)    Prostate cancer

## 2019-02-06 NOTE — ED PROVIDER NOTE - ATTENDING CONTRIBUTION TO CARE
Patient presents for evaluation of abdoiminal pain he has a history of dementia and cannot provide reliable clinical history in addition the patient has had a complicated operative course with multiple surgeries.  On physical exam he is nc/at perrla eomi oropharynx dry cta b/l, abd-patient has draining fistulous wound in epigastric region with no guarding or rebound ext from no focal deficits   A/P- we obtained labs and ct, in addition consulted surgery to evaluate patient I will continue to monitor at this time

## 2019-02-06 NOTE — ED PROVIDER NOTE - MEDICAL DECISION MAKING DETAILS
Patient evaluated by surgery at this time, recommend wound care, after discussing with the patient's son he feels that the patient should be admitted for further workup and management at this time.

## 2019-02-06 NOTE — ED PROVIDER NOTE - PROGRESS NOTE DETAILS
Guillen changed. 700 cc drained.  + mild pyruia. Aide states that patient's has had episodes of pyuria in the past due to patients renal disease and has 30 % function of his kidneys Guillen changed. 700 cc drained.  + mild pyuria. Aide states that patient's has had episodes of pyuria in the past due to patients renal disease and has 30 % function of his kidneys Spoke to Surgery PA. Will see patient. Spoke to surgery again. Patient draining bile from wound. PORTILLO Villegas will come see the patient. Spoke to son Vinod. Requests patient be admitted for placement due to son not being able to handle taking care of the patient. Spoke to son Vinod. Requests patient be admitted for placement due to son not being able to handle taking care of the patient. Patient will be admitted to hospital for placement in Nursing home. Son requesting [facility] if possible.  Son can be reached at [phone]. Patient requires wound care.

## 2019-02-06 NOTE — ED PROVIDER NOTE - OBJECTIVE STATEMENT
89 yo male, hard of hearing, with PMH of CKD, early Dementia, incisional hernia, prostate cancer (in remission), colon cancer (in remission), cardiac stent, JOSE, colostomy bag in place presents to the ED c/o abdominal pain.  Patient was seen by Dr. Garces in August for incisional hernia, parastomal hernia, and exploratory laparotomy, and instructed to follow up with Dr. Moraes. Spoke to son who states that the patient has multiple hernias and a fistula in  the abdomen.  Son states patient does not complain often so they sent him to the ED after patient complained of abdominal pain and has not urinated in 2 days.  PMD is Jermaine.

## 2019-02-06 NOTE — H&P ADULT - PROBLEM SELECTOR PLAN 2
I cannot locate previous lab values but feel patient has element of GUERITA probably on basis of diuresis with Lasix and obstructive component. Guillen now draining, will give gentle hydration and hold lasix for 1-2 days

## 2019-02-06 NOTE — ED ADULT TRIAGE NOTE - CHIEF COMPLAINT QUOTE
BIBA via [facility] from home, as per EMS patient is experiencing qureshi catheter complications and believes tube is clogged and colostomy bag has decreased output

## 2019-02-07 DIAGNOSIS — N30.90 CYSTITIS, UNSPECIFIED WITHOUT HEMATURIA: ICD-10-CM

## 2019-02-07 DIAGNOSIS — Z43.3 ENCOUNTER FOR ATTENTION TO COLOSTOMY: ICD-10-CM

## 2019-02-07 DIAGNOSIS — K63.2 FISTULA OF INTESTINE: ICD-10-CM

## 2019-02-07 DIAGNOSIS — Z65.9 PROBLEM RELATED TO UNSPECIFIED PSYCHOSOCIAL CIRCUMSTANCES: ICD-10-CM

## 2019-02-07 DIAGNOSIS — N40.0 BENIGN PROSTATIC HYPERPLASIA WITHOUT LOWER URINARY TRACT SYMPTOMS: ICD-10-CM

## 2019-02-07 DIAGNOSIS — D50.9 IRON DEFICIENCY ANEMIA, UNSPECIFIED: ICD-10-CM

## 2019-02-07 DIAGNOSIS — I71.4 ABDOMINAL AORTIC ANEURYSM, WITHOUT RUPTURE: ICD-10-CM

## 2019-02-07 DIAGNOSIS — N18.9 CHRONIC KIDNEY DISEASE, UNSPECIFIED: ICD-10-CM

## 2019-02-07 LAB
HCT VFR BLD CALC: 26.3 % — LOW (ref 42–52)
HGB BLD-MCNC: 8.4 G/DL — LOW (ref 14–18)
MCHC RBC-ENTMCNC: 30.9 PG — SIGNIFICANT CHANGE UP (ref 27–31)
MCHC RBC-ENTMCNC: 31.9 G/DL — LOW (ref 32–37)
MCV RBC AUTO: 96.7 FL — HIGH (ref 80–94)
NRBC # BLD: 0 /100 WBCS — SIGNIFICANT CHANGE UP (ref 0–0)
PLATELET # BLD AUTO: 186 K/UL — SIGNIFICANT CHANGE UP (ref 130–400)
RBC # BLD: 2.72 M/UL — LOW (ref 4.7–6.1)
RBC # FLD: 14.5 % — SIGNIFICANT CHANGE UP (ref 11.5–14.5)
WBC # BLD: 8.37 K/UL — SIGNIFICANT CHANGE UP (ref 4.8–10.8)
WBC # FLD AUTO: 8.37 K/UL — SIGNIFICANT CHANGE UP (ref 4.8–10.8)

## 2019-02-07 PROCEDURE — 99221 1ST HOSP IP/OBS SF/LOW 40: CPT

## 2019-02-07 RX ORDER — FINASTERIDE 5 MG/1
5 TABLET, FILM COATED ORAL DAILY
Qty: 0 | Refills: 0 | Status: DISCONTINUED | OUTPATIENT
Start: 2019-02-07 | End: 2019-02-15

## 2019-02-07 RX ORDER — TAMSULOSIN HYDROCHLORIDE 0.4 MG/1
0.4 CAPSULE ORAL AT BEDTIME
Qty: 0 | Refills: 0 | Status: DISCONTINUED | OUTPATIENT
Start: 2019-02-07 | End: 2019-02-15

## 2019-02-07 RX ORDER — CHLORHEXIDINE GLUCONATE 213 G/1000ML
1 SOLUTION TOPICAL
Qty: 0 | Refills: 0 | Status: DISCONTINUED | OUTPATIENT
Start: 2019-02-07 | End: 2019-02-15

## 2019-02-07 RX ORDER — CEFTRIAXONE 500 MG/1
1 INJECTION, POWDER, FOR SOLUTION INTRAMUSCULAR; INTRAVENOUS EVERY 24 HOURS
Qty: 0 | Refills: 0 | Status: DISCONTINUED | OUTPATIENT
Start: 2019-02-08 | End: 2019-02-11

## 2019-02-07 RX ORDER — CEFTRIAXONE 500 MG/1
INJECTION, POWDER, FOR SOLUTION INTRAMUSCULAR; INTRAVENOUS
Qty: 0 | Refills: 0 | Status: DISCONTINUED | OUTPATIENT
Start: 2019-02-07 | End: 2019-02-11

## 2019-02-07 RX ORDER — CEFTRIAXONE 500 MG/1
1 INJECTION, POWDER, FOR SOLUTION INTRAMUSCULAR; INTRAVENOUS ONCE
Qty: 0 | Refills: 0 | Status: COMPLETED | OUTPATIENT
Start: 2019-02-07 | End: 2019-02-07

## 2019-02-07 RX ORDER — HEPARIN SODIUM 5000 [USP'U]/ML
5000 INJECTION INTRAVENOUS; SUBCUTANEOUS EVERY 12 HOURS
Qty: 0 | Refills: 0 | Status: DISCONTINUED | OUTPATIENT
Start: 2019-02-07 | End: 2019-02-15

## 2019-02-07 RX ORDER — FERROUS SULFATE 325(65) MG
325 TABLET ORAL DAILY
Qty: 0 | Refills: 0 | Status: DISCONTINUED | OUTPATIENT
Start: 2019-02-07 | End: 2019-02-15

## 2019-02-07 RX ORDER — SODIUM CHLORIDE 9 MG/ML
1000 INJECTION INTRAMUSCULAR; INTRAVENOUS; SUBCUTANEOUS
Qty: 0 | Refills: 0 | Status: DISCONTINUED | OUTPATIENT
Start: 2019-02-07 | End: 2019-02-07

## 2019-02-07 RX ORDER — SODIUM CHLORIDE 9 MG/ML
1000 INJECTION INTRAMUSCULAR; INTRAVENOUS; SUBCUTANEOUS
Qty: 0 | Refills: 0 | Status: DISCONTINUED | OUTPATIENT
Start: 2019-02-07 | End: 2019-02-08

## 2019-02-07 RX ADMIN — Medication 325 MILLIGRAM(S): at 13:37

## 2019-02-07 RX ADMIN — FINASTERIDE 5 MILLIGRAM(S): 5 TABLET, FILM COATED ORAL at 13:37

## 2019-02-07 RX ADMIN — SODIUM CHLORIDE 75 MILLILITER(S): 9 INJECTION INTRAMUSCULAR; INTRAVENOUS; SUBCUTANEOUS at 01:11

## 2019-02-07 RX ADMIN — HEPARIN SODIUM 5000 UNIT(S): 5000 INJECTION INTRAVENOUS; SUBCUTANEOUS at 17:11

## 2019-02-07 RX ADMIN — CEFTRIAXONE 100 GRAM(S): 500 INJECTION, POWDER, FOR SOLUTION INTRAMUSCULAR; INTRAVENOUS at 23:02

## 2019-02-07 RX ADMIN — SODIUM CHLORIDE 75 MILLILITER(S): 9 INJECTION INTRAMUSCULAR; INTRAVENOUS; SUBCUTANEOUS at 16:44

## 2019-02-07 RX ADMIN — SODIUM CHLORIDE 75 MILLILITER(S): 9 INJECTION INTRAMUSCULAR; INTRAVENOUS; SUBCUTANEOUS at 05:49

## 2019-02-07 RX ADMIN — HEPARIN SODIUM 5000 UNIT(S): 5000 INJECTION INTRAVENOUS; SUBCUTANEOUS at 05:49

## 2019-02-07 RX ADMIN — CEFTRIAXONE 100 GRAM(S): 500 INJECTION, POWDER, FOR SOLUTION INTRAMUSCULAR; INTRAVENOUS at 01:10

## 2019-02-07 RX ADMIN — TAMSULOSIN HYDROCHLORIDE 0.4 MILLIGRAM(S): 0.4 CAPSULE ORAL at 21:14

## 2019-02-07 RX ADMIN — CHLORHEXIDINE GLUCONATE 1 APPLICATION(S): 213 SOLUTION TOPICAL at 05:49

## 2019-02-07 NOTE — PHYSICAL THERAPY INITIAL EVALUATION ADULT - GAIT DEVIATIONS NOTED, PT EVAL
stooped posture, dec heel strike/pushoff/decreased step length/decreased jama/decreased weight-shifting ability

## 2019-02-07 NOTE — PROGRESS NOTE ADULT - SUBJECTIVE AND OBJECTIVE BOX
Pt seen and examined at bedside. Denies any complaints. AOx1.     VITAL SIGNS (Last 24 hrs):  T(C): 36.8 (02-07-19 @ 04:34), Max: 36.8 (02-07-19 @ 04:34)  HR: 73 (02-07-19 @ 04:34) (66 - 78)  BP: 127/98 (02-07-19 @ 04:34) (113/57 - 143/71)  RR: 16 (02-07-19 @ 04:34) (16 - 18)  SpO2: 100% (02-06-19 @ 17:27) (100% - 100%)  Wt(kg): --  Daily Height in cm: 170.18 (06 Feb 2019 22:25)    Daily     I&O's Summary    06 Feb 2019 07:01  -  07 Feb 2019 07:00  --------------------------------------------------------  IN: 0 mL / OUT: 1600 mL / NET: -1600 mL        PHYSICAL EXAM:  GENERAL: NAD, well-developed  HEAD:  Atraumatic, Normocephalic  EYES: EOMI, PERRLA, conjunctiva and sclera clear  NECK: Supple, No JVD  CHEST/LUNG: Clear to auscultation bilaterally; No wheeze  HEART: Regular rate and rhythm; No murmurs, rubs, or gallops  ABDOMEN: Soft, Nontender, Nondistended; Bowel sounds present  EXTREMITIES:  2+ Peripheral Pulses, No clubbing, cyanosis, or edema  NEUROLOGY: non-focal  SKIN: No rashes or lesions    Labs Reviewed  Spoke to patient in regards to abnormal labs.    CBC Full  -  ( 07 Feb 2019 08:38 )  WBC Count : 8.37 K/uL  Hemoglobin : 8.4 g/dL  Hematocrit : 26.3 %  Platelet Count - Automated : 186 K/uL  Mean Cell Volume : 96.7 fL  Mean Cell Hemoglobin : 30.9 pg  Mean Cell Hemoglobin Concentration : 31.9 g/dL  Auto Neutrophil # : x  Auto Lymphocyte # : x  Auto Monocyte # : x  Auto Eosinophil # : x  Auto Basophil # : x  Auto Neutrophil % : x  Auto Lymphocyte % : x  Auto Monocyte % : x  Auto Eosinophil % : x  Auto Basophil % : x    BMP:    02-06 @ 11:45    Blood Urea Nitrogen - 68  Calcium - 8.5  Carbond Dioxide - 16  Chloride - 110  Creatinine - 3.5  Glucose - 93  Potassium - 5.3  Sodium - 139       MEDICATIONS  (STANDING):  cefTRIAXone   IVPB      chlorhexidine 4% Liquid 1 Application(s) Topical <User Schedule>  ferrous    sulfate 325 milliGRAM(s) Oral daily  finasteride 5 milliGRAM(s) Oral daily  heparin  Injectable 5000 Unit(s) SubCutaneous every 12 hours  sodium chloride 0.9%. 1000 milliLiter(s) (75 mL/Hr) IV Continuous <Continuous>  tamsulosin 0.4 milliGRAM(s) Oral at bedtime    MEDICATIONS  (PRN):

## 2019-02-07 NOTE — PHYSICAL THERAPY INITIAL EVALUATION ADULT - GENERAL OBSERVATIONS, REHAB EVAL
13:46-14:06 Chart reviewed. Pt encountered sitting in chair,  may be seen by Physical Therapist as confirmed with Nurse. Patient denied pain at rest and would like to walk now so he can go back to bed

## 2019-02-07 NOTE — CONSULT NOTE ADULT - ASSESSMENT
IMPRESSION: Rehab of 89 y/o  m rehab  for  debility  gd      PRECAUTIONS: [  ] Cardiac  [  ] Respiratory  [  ] Seizures [  ] Contact Isolation  [  ] Droplet Isolation  [ FALL ] Other    Weight Bearing Status:     RECOMMENDATION:    Out of Bed to Chair     DVT/Decubiti Prophylaxis    REHAB PLAN:     [   xx] Bedside P/T 3-5 times a week   [   ]   Bedside O/T  2-3 times a week             [   ] No Rehab Therapy Indicated                   [   ]  Speech Therapy   Conditioning/ROM                                    ADL  Bed Mobility                                               Conditioning/ROM  Transfers                                                     Bed Mobility  Sitting /Standing Balance                         Transfers                                        Gait Training                                               Sitting/Standing Balance  Stair Training [   ]Applicable                    Home equipment Eval                                                                        Splinting  [   ] Only      GOALS:   ADL   [ x  ]   Independent                    Transfers  [ x  ] Independent                          Ambulation  [  x] Independent     [ x   ] With device                            [  x]  CG                                                         [  x ]  CG                                                                  [   ] CG                            [    ] Min A                                                   [   ] Min A                                                              [   ] Min  A          DISCHARGE PLAN:   [   ]  Good candidate for Intensive Rehabilitation/Hospital based-[facility]A [facility]                                             Will tolerate 3hrs Intensive Rehab Daily                                       [    xx]  Short Term Rehab in Skilled Nursing Facility ptn  may  need  to  LTC vs  24 hr  care                                         [    ]  Home with Outpatient or VN services                                         [    ]  Possible Candidate for Intensive Hospital based Rehab

## 2019-02-08 LAB
ANION GAP SERPL CALC-SCNC: 10 MMOL/L — SIGNIFICANT CHANGE UP (ref 7–14)
BUN SERPL-MCNC: 59 MG/DL — HIGH (ref 10–20)
CALCIUM SERPL-MCNC: 8.3 MG/DL — LOW (ref 8.5–10.1)
CHLORIDE SERPL-SCNC: 116 MMOL/L — HIGH (ref 98–110)
CO2 SERPL-SCNC: 16 MMOL/L — LOW (ref 17–32)
CREAT SERPL-MCNC: 3.2 MG/DL — HIGH (ref 0.7–1.5)
GLUCOSE SERPL-MCNC: 94 MG/DL — SIGNIFICANT CHANGE UP (ref 70–99)
HCT VFR BLD CALC: 26.2 % — LOW (ref 42–52)
HGB BLD-MCNC: 8.3 G/DL — LOW (ref 14–18)
MCHC RBC-ENTMCNC: 30.3 PG — SIGNIFICANT CHANGE UP (ref 27–31)
MCHC RBC-ENTMCNC: 31.7 G/DL — LOW (ref 32–37)
MCV RBC AUTO: 95.6 FL — HIGH (ref 80–94)
NRBC # BLD: 0 /100 WBCS — SIGNIFICANT CHANGE UP (ref 0–0)
PLATELET # BLD AUTO: 167 K/UL — SIGNIFICANT CHANGE UP (ref 130–400)
POTASSIUM SERPL-MCNC: 5.4 MMOL/L — HIGH (ref 3.5–5)
POTASSIUM SERPL-SCNC: 5.4 MMOL/L — HIGH (ref 3.5–5)
RBC # BLD: 2.74 M/UL — LOW (ref 4.7–6.1)
RBC # FLD: 14.6 % — HIGH (ref 11.5–14.5)
SODIUM SERPL-SCNC: 142 MMOL/L — SIGNIFICANT CHANGE UP (ref 135–146)
WBC # BLD: 6.26 K/UL — SIGNIFICANT CHANGE UP (ref 4.8–10.8)
WBC # FLD AUTO: 6.26 K/UL — SIGNIFICANT CHANGE UP (ref 4.8–10.8)

## 2019-02-08 RX ORDER — SODIUM CHLORIDE 9 MG/ML
1000 INJECTION, SOLUTION INTRAVENOUS
Qty: 0 | Refills: 0 | Status: DISCONTINUED | OUTPATIENT
Start: 2019-02-08 | End: 2019-02-10

## 2019-02-08 RX ORDER — SODIUM BICARBONATE 1 MEQ/ML
650 SYRINGE (ML) INTRAVENOUS THREE TIMES A DAY
Qty: 0 | Refills: 0 | Status: DISCONTINUED | OUTPATIENT
Start: 2019-02-08 | End: 2019-02-15

## 2019-02-08 RX ADMIN — SODIUM CHLORIDE 75 MILLILITER(S): 9 INJECTION, SOLUTION INTRAVENOUS at 18:08

## 2019-02-08 RX ADMIN — TAMSULOSIN HYDROCHLORIDE 0.4 MILLIGRAM(S): 0.4 CAPSULE ORAL at 21:06

## 2019-02-08 RX ADMIN — Medication 325 MILLIGRAM(S): at 09:46

## 2019-02-08 RX ADMIN — FINASTERIDE 5 MILLIGRAM(S): 5 TABLET, FILM COATED ORAL at 09:46

## 2019-02-08 RX ADMIN — HEPARIN SODIUM 5000 UNIT(S): 5000 INJECTION INTRAVENOUS; SUBCUTANEOUS at 18:14

## 2019-02-08 RX ADMIN — Medication 650 MILLIGRAM(S): at 21:06

## 2019-02-08 RX ADMIN — HEPARIN SODIUM 5000 UNIT(S): 5000 INJECTION INTRAVENOUS; SUBCUTANEOUS at 05:21

## 2019-02-08 NOTE — CONSULT NOTE ADULT - ASSESSMENT
89/M with GUERITA on CKD 4, BPH/Urinary retention, Indwelling Guillen, Colon Ca, s/p colostomy admitted with abdominal pain, due to clogged Guillen catheter, found to have GUERITA, cystitis on CT. Needs placement as per son's request.    GUERITA due to dehydration   - cont IVF, but change to 1/2 NS 75 cc/hr  -strict Is and Os  - no hydro on CT, but cystitis noted  CKD - baseline creat ~ 2.5 mg%    Hyperkalemia - please change diet to 2 gr K  no need for kayxalate, check EKG, expect to improve with hydration    Metabolic Acidosis - start NA bicarb 650 po tid  due to CKD    Indwelling Guillen - check for UTI  CT showed cystitis    Will follow 89/M with GUERITA on CKD 4, BPH/Urinary retention, Indwelling Guillen, Colon Ca, s/p colostomy admitted with abdominal pain, due to clogged Guillen catheter, found to have GUERITA, cystitis on CT. Needs placement as per son's request.    GUERITA due to dehydration   - cont IVF, but change to 1/2 NS 75 cc/hr  -strict Is and Os  - no hydro on CT, but cystitis noted  CKD - baseline creat ~ 2.5 mg%    Hyperkalemia - please change diet to 2 gr K  no need for kayxalate, check EKG, expect to improve with hydration    Metabolic Acidosis - start NA bicarb 650 po tid  due to CKD    Indwelling Guillen - UTI   on Rocephine, f/u UCx  CT showed cystitis    Anemia - check iron stores  may need Procrit vs Iron    Will follow

## 2019-02-08 NOTE — PROGRESS NOTE ADULT - SUBJECTIVE AND OBJECTIVE BOX
Pt seen and examined at bedside.     VITAL SIGNS (Last 24 hrs):  T(C): 35.1 (02-08-19 @ 14:23), Max: 36.2 (02-07-19 @ 22:12)  HR: 66 (02-08-19 @ 14:23) (60 - 66)  BP: 116/63 (02-08-19 @ 14:23) (116/63 - 160/72)  RR: 16 (02-08-19 @ 14:23) (16 - 16)  SpO2: --  Wt(kg): --  Daily     Daily     I&O's Summary    07 Feb 2019 07:01  -  08 Feb 2019 07:00  --------------------------------------------------------  IN: 0 mL / OUT: 700 mL / NET: -700 mL    08 Feb 2019 07:01  -  08 Feb 2019 16:03  --------------------------------------------------------  IN: 0 mL / OUT: 900 mL / NET: -900 mL        PHYSICAL EXAM:  GENERAL: NAD, well-developed  HEAD:  Atraumatic, Normocephalic  EYES:  conjunctiva and sclera clear  NECK: Supple, No JVD  CHEST/LUNG: Clear to auscultation bilaterally; No wheeze  HEART: Regular rate and rhythm; No murmurs, rubs, or gallops  ABDOMEN: Soft, Nontender, Nondistended; Bowel sounds present  EXTREMITIES:  2+ Peripheral Pulses, No clubbing, cyanosis, or edema  PSYCH: AAOx1  NEUROLOGY: non-focal  SKIN: No rashes or lesions    Labs Reviewed  Spoke to patient in regards to abnormal labs.    CBC Full  -  ( 08 Feb 2019 08:58 )  WBC Count : 6.26 K/uL  Hemoglobin : 8.3 g/dL  Hematocrit : 26.2 %  Platelet Count - Automated : 167 K/uL  Mean Cell Volume : 95.6 fL  Mean Cell Hemoglobin : 30.3 pg  Mean Cell Hemoglobin Concentration : 31.7 g/dL  Auto Neutrophil # : x  Auto Lymphocyte # : x  Auto Monocyte # : x  Auto Eosinophil # : x  Auto Basophil # : x  Auto Neutrophil % : x  Auto Lymphocyte % : x  Auto Monocyte % : x  Auto Eosinophil % : x  Auto Basophil % : x    BMP:    02-08 @ 08:58    Blood Urea Nitrogen - 59  Calcium - 8.3  Carbond Dioxide - 16  Chloride - 116  Creatinine - 3.2  Glucose - 94  Potassium - 5.4  Sodium - 142      Urine Culture:  02-06 @ 14:30 Urine culture: --    Culture Results:   >100,000 CFU/ml Gram Negative Rods  Method Type: --  Organism: --  Organism Identification: --  Specimen Source: .Urine Clean Catch (Midstream)  02-06 @ 11:45 Urine culture: --    Culture Results:   No growth to date.  Method Type: --  Organism: --  Organism Identification: --  Specimen Source: .Blood Blood      MEDICATIONS  (STANDING):  cefTRIAXone   IVPB      cefTRIAXone   IVPB 1 Gram(s) IV Intermittent every 24 hours  chlorhexidine 4% Liquid 1 Application(s) Topical <User Schedule>  ferrous    sulfate 325 milliGRAM(s) Oral daily  finasteride 5 milliGRAM(s) Oral daily  heparin  Injectable 5000 Unit(s) SubCutaneous every 12 hours  sodium chloride 0.9%. 1000 milliLiter(s) (75 mL/Hr) IV Continuous <Continuous>  tamsulosin 0.4 milliGRAM(s) Oral at bedtime    MEDICATIONS  (PRN):

## 2019-02-08 NOTE — CONSULT NOTE ADULT - SUBJECTIVE AND OBJECTIVE BOX
NEPHROLOGY CONSULTATION NOTE    Patient is a 89y Male whom presented to the hospital with abdominal pain and clogged Qureshi which was changed in ED. 700 cc drained.  Pt with know CKD 4, baseline creat ~ 2.5, indwelling Qureshi for retention, BPH, Colon Ca colostomy, enterocutaneous fistula, parastomal hernia, AAA repair, admitted with abdominal pain. Pt found to have clogged Qureshi catheter which which was changed. Lives alone. Seen for GUERITA, dehydrated, good po intake, no diarrhea.      PAST MEDICAL & SURGICAL HISTORY:  Dementia  JOSE (iron deficiency anemia)  Colon cancer  Prostate cancer  CKD (chronic kidney disease)  Enlarged prostate  Colostomy care  History of coronary artery stent placement  H/O exploratory laparotomy  Abdominal fistula  Parastomal hernia  Incisional hernia    Allergies:  No Known Allergies    Home Medications Reviewed  Hospital Medications:   MEDICATIONS  (STANDING):  cefTRIAXone   IVPB      cefTRIAXone   IVPB 1 Gram(s) IV Intermittent every 24 hours  chlorhexidine 4% Liquid 1 Application(s) Topical <User Schedule>  ferrous    sulfate 325 milliGRAM(s) Oral daily  finasteride 5 milliGRAM(s) Oral daily  heparin  Injectable 5000 Unit(s) SubCutaneous every 12 hours  sodium chloride 0.9%. 1000 milliLiter(s) (75 mL/Hr) IV Continuous <Continuous>  tamsulosin 0.4 milliGRAM(s) Oral at bedtime      SOCIAL HISTORY:  Denies ETOH,Smoking,   FAMILY HISTORY:  Family history unknown        REVIEW OF SYSTEMS:  CONSTITUTIONAL: No weakness, fevers or chills  EYES/ENT: No visual changes;  No vertigo or throat pain   NECK: No pain or stiffness  RESPIRATORY: No cough, wheezing, hemoptysis; No shortness of breath  CARDIOVASCULAR: No chest pain or palpitations.  GASTROINTESTINAL: No abdominal or epigastric pain. No nausea, vomiting, or hematemesis; No diarrhea or constipation.   GENITOURINARY: indwelling Qureshi  VASCULAR: No bilateral lower extremity edema.   All other review of systems is negative unless indicated above.    VITALS:  T(F): 96 (19 @ 05:11), Max: 97.4 (19 @ 13:44)  HR: 61 (19 @ 05:11)  BP: 145/67 (19 @ 05:11)  RR: 16 (19 @ 05:11)  SpO2: --     @ 07: @ 07:00  --------------------------------------------------------  IN: 0 mL / OUT: 700 mL / NET: -700 mL          19 @ 07:01  -  19 @ 07:00  --------------------------------------------------------  IN: 0 mL / OUT: 200 mL / NET: -200 mL      I&O's Detail    2019 07:  -  2019 07:00  --------------------------------------------------------  IN:  Total IN: 0 mL    OUT:    Colostomy: 100 mL    Indwelling Catheter - Urethral: 200 mL    Voided: 400 mL  Total OUT: 700 mL    Total NET: -700 mL            PHYSICAL EXAM:  Constitutional: NAD  HEENT: anicteric sclera, dry mucous membranes  Neck: No JVD  Respiratory: CTAB, no wheezes, rales or rhonchi  Cardiovascular: S1, S2, RRR  Gastrointestinal: BS+, soft, NT/ND, Lt colostomy,   Extremities:  No peripheral edema  Neurological: Awake alert, [place]  Psychiatric: Normal mood, normal affect  : No CVA tenderness. Indwelling  qureshi.   Skin: No rashes  Vascular Access:    LABS:      139  |  110  |  68<HH>  ----------------------------<  93  5.3<H>   |  16<L>  |  3.5<H>    Ca    8.5      2019 11:45    TPro  6.6  /  Alb  3.4<L>  /  TBili  0.3  /  DBili      /  AST  21  /  ALT  15  /  AlkPhos  94      Creatinine Trend: 3.5 <--                        8.4    8.37  )-----------( 186      ( 2019 08:38 )             26.3     Urine Studies:  Urinalysis Basic - ( 2019 14:30 )    Color: Yellow / Appearance: Cloudy / S.020 / pH:   Gluc:  / Ketone: Negative  / Bili: Negative / Urobili: 0.2 mg/dL   Blood:  / Protein: 100 mg/dL / Nitrite: Positive   Leuk Esterase: Large / RBC:  / WBC >50 /HPF   Sq Epi:  / Non Sq Epi: Occasional /HPF / Bacteria: TNTC /HPF                RADIOLOGY & ADDITIONAL STUDIES:    < from: CT Abdomen and Pelvis No Cont (19 @ 16:50) >    KIDNEYS: No hydroureteronephrosis bilaterally. Bilateral renal cysts,   stable.    ABDOMINOPELVIC NODES: Unremarkable.    PELVIC ORGANS: Qureshi catheter is seen within the urinary bladder with   nondependent air consistent with instrumentation. Diffuse wall thickening   and perivesicular fat stranding of the urinary bladder most consistent   with cystitis. Brachial therapy seeds in the prostate.    PERITONEUM/MESENTERY/BOWEL: No evidence of bowel obstruction. Patient is   post left lower quadrant ostomy formation with Johny pouch. Again seen   is a ventral abdominal wall hernia containing several loops of   nonobstructed small and large bowel. Surgical suture lines within the   small bowel and right colon are present prior resections. There is   peristomal hernia in the left lower quadrant containing nonobstructive   small bowel loops.    BONES/SOFT TISSUES: Diffuse osteopenia. Right total hip arthroplasty,   stable. Degenerative changes of the left hip, sacroiliac joints and the   spine, stable.    OTHER: Again seen is post endograft repair of infrarenal abdominal aortic   aneurysm extending into bilateral common iliac arteries. The aneurysmal   sac measures up to 5.7 cm, mildly increased since 2015   (previously up to 5.2 cm).      IMPRESSION: Diffuse wall thickening and perivesicular fat stranding of   the urinary bladder, most consistent with cystitis. Correlate with   urinalysis.    Qureshi catheter in place.    Stable appearance of postoperative changes of the large bowel and small   bowel, with left lower quadrant ostomy, and a parastomal hernia   containing nonobstructive small bowel loops.    Stable appearance of ventral hernia containing multiple loops of   nonobstructed small bowel.    Again noted post endograft repair of infrarenal abdominal aortic aneurysm   with interval further mild dilatation of the native aneurysm sac   measuring 5.7 cm (previously 5.2 cm in 2015).    < end of copied text >  < from: CT Abdomen and Pelvis No Cont (19 @ 16:50) >    KIDNEYS: No hydroureteronephrosis bilaterally. Bilateral renal cysts,   stable.    ABDOMINOPELVIC NODES: Unremarkable.    PELVIC ORGANS: Qureshi catheter is seen within the urinary bladder with   nondependent air consistent with instrumentation. Diffuse wall thickening   and perivesicular fat stranding of the urinary bladder most consistent   with cystitis. Brachial therapy seeds in the prostate.    PERITONEUM/MESENTERY/BOWEL: No evidence of bowel obstruction. Patient is   post left lower quadrant ostomy formation with Johny pouch. Again seen   is a ventral abdominal wall hernia containing several loops of   nonobstructed small and large bowel. Surgical suture lines within the   small bowel and right colon are present prior resections. There is   peristomal hernia in the left lower quadrant containing nonobstructive   small bowel loops.    BONES/SOFT TISSUES: Diffuse osteopenia. Right total hip arthroplasty,   stable. Degenerative changes of the left hip, sacroiliac joints and the   spine, stable.    OTHER: Again seen is post endograft repair of infrarenal abdominal aortic   aneurysm extending into bilateral common iliac arteries. The aneurysmal   sac measures up to 5.7 cm, mildly increased since 2015   (previously up to 5.2 cm).      IMPRESSION: Diffuse wall thickening and perivesicular fat stranding of   the urinary bladder, most consistent with cystitis. Correlate with   urinalysis.    Qureshi catheter in place.    Stable appearance of postoperative changes of the large bowel and small   bowel, with left lower quadrant ostomy, and a parastomal hernia   containing nonobstructive small bowel loops.    Stable appearance of ventral hernia containing multiple loops of   nonobstructed small bowel.    Again noted post endograft repair of infrarenal abdominal aortic aneurysm   with interval further mild dilatation of the native aneurysm sac   measuring 5.7 cm (previously 5.2 cm in 2015).    < end of copied text >  < from: CT Abdomen and Pelvis No Cont (19 @ 16:50) >    KIDNEYS: No hydroureteronephrosis bilaterally. Bilateral renal cysts,   stable.    ABDOMINOPELVIC NODES: Unremarkable.    PELVIC ORGANS: Qureshi catheter is seen within the urinary bladder with   nondependent air consistent with instrumentation. Diffuse wall thickening   and perivesicular fat stranding of the urinary bladder most consistent   with cystitis. Brachial therapy seeds in the prostate.    PERITONEUM/MESENTERY/BOWEL: No evidence of bowel obstruction. Patient is   post left lower quadrant ostomy formation with Johny pouch. Again seen   is a ventral abdominal wall hernia containing several loops of   nonobstructed small and large bowel. Surgical suture lines within the   small bowel and right colon are present prior resections. There is   peristomal hernia in the left lower quadrant containing nonobstructive   small bowel loops.    BONES/SOFT TISSUES: Diffuse osteopenia. Right total hip arthroplasty,   stable. Degenerative changes of the left hip, sacroiliac joints and the   spine, stable.    OTHER: Again seen is post endograft repair of infrarenal abdominal aortic   aneurysm extending into bilateral common iliac arteries. The aneurysmal   sac measures up to 5.7 cm, mildly increased since 2015   (previously up to 5.2 cm).      IMPRESSION: Diffuse wall thickening and perivesicular fat stranding of   the urinary bladder, most consistent with cystitis. Correlate with   urinalysis.    Qureshi catheter in place.    Stable appearance of postoperative changes of the large bowel and small   bowel, with left lower quadrant ostomy, and a parastomal hernia   containing nonobstructive small bowel loops.    Stable appearance of ventral hernia containing multiple loops of   nonobstructed small bowel.    Again noted post endograft repair of infrarenal abdominal aortic aneurysm   with interval further mild dilatation of the native aneurysm sac   measuring 5.7 cm (previously 5.2 cm in ).    < end of copied text >

## 2019-02-09 LAB
ANION GAP SERPL CALC-SCNC: 8 MMOL/L — SIGNIFICANT CHANGE UP (ref 7–14)
BUN SERPL-MCNC: 46 MG/DL — HIGH (ref 10–20)
CALCIUM SERPL-MCNC: 8.1 MG/DL — LOW (ref 8.5–10.1)
CHLORIDE SERPL-SCNC: 114 MMOL/L — HIGH (ref 98–110)
CO2 SERPL-SCNC: 18 MMOL/L — SIGNIFICANT CHANGE UP (ref 17–32)
CREAT SERPL-MCNC: 2.6 MG/DL — HIGH (ref 0.7–1.5)
GLUCOSE SERPL-MCNC: 99 MG/DL — SIGNIFICANT CHANGE UP (ref 70–99)
HCT VFR BLD CALC: 25.5 % — LOW (ref 42–52)
HGB BLD-MCNC: 8.1 G/DL — LOW (ref 14–18)
IRON SATN MFR SERPL: 17 % — SIGNIFICANT CHANGE UP (ref 15–50)
IRON SATN MFR SERPL: 29 UG/DL — LOW (ref 35–150)
MCHC RBC-ENTMCNC: 30.3 PG — SIGNIFICANT CHANGE UP (ref 27–31)
MCHC RBC-ENTMCNC: 31.8 G/DL — LOW (ref 32–37)
MCV RBC AUTO: 95.5 FL — HIGH (ref 80–94)
NRBC # BLD: 0 /100 WBCS — SIGNIFICANT CHANGE UP (ref 0–0)
PLATELET # BLD AUTO: 168 K/UL — SIGNIFICANT CHANGE UP (ref 130–400)
POTASSIUM SERPL-MCNC: 5.3 MMOL/L — HIGH (ref 3.5–5)
POTASSIUM SERPL-SCNC: 5.3 MMOL/L — HIGH (ref 3.5–5)
RBC # BLD: 2.67 M/UL — LOW (ref 4.7–6.1)
RBC # FLD: 14.3 % — SIGNIFICANT CHANGE UP (ref 11.5–14.5)
SODIUM SERPL-SCNC: 140 MMOL/L — SIGNIFICANT CHANGE UP (ref 135–146)
TIBC SERPL-MCNC: 169 UG/DL — LOW (ref 220–430)
UIBC SERPL-MCNC: 140 UG/DL — SIGNIFICANT CHANGE UP (ref 110–370)
WBC # BLD: 5.82 K/UL — SIGNIFICANT CHANGE UP (ref 4.8–10.8)
WBC # FLD AUTO: 5.82 K/UL — SIGNIFICANT CHANGE UP (ref 4.8–10.8)

## 2019-02-09 RX ADMIN — SODIUM CHLORIDE 75 MILLILITER(S): 9 INJECTION, SOLUTION INTRAVENOUS at 23:51

## 2019-02-09 RX ADMIN — HEPARIN SODIUM 5000 UNIT(S): 5000 INJECTION INTRAVENOUS; SUBCUTANEOUS at 05:53

## 2019-02-09 RX ADMIN — TAMSULOSIN HYDROCHLORIDE 0.4 MILLIGRAM(S): 0.4 CAPSULE ORAL at 21:21

## 2019-02-09 RX ADMIN — Medication 650 MILLIGRAM(S): at 05:53

## 2019-02-09 RX ADMIN — FINASTERIDE 5 MILLIGRAM(S): 5 TABLET, FILM COATED ORAL at 11:34

## 2019-02-09 RX ADMIN — CEFTRIAXONE 100 GRAM(S): 500 INJECTION, POWDER, FOR SOLUTION INTRAMUSCULAR; INTRAVENOUS at 23:43

## 2019-02-09 RX ADMIN — CEFTRIAXONE 100 GRAM(S): 500 INJECTION, POWDER, FOR SOLUTION INTRAMUSCULAR; INTRAVENOUS at 00:44

## 2019-02-09 RX ADMIN — SODIUM CHLORIDE 75 MILLILITER(S): 9 INJECTION, SOLUTION INTRAVENOUS at 13:28

## 2019-02-09 RX ADMIN — Medication 650 MILLIGRAM(S): at 13:26

## 2019-02-09 RX ADMIN — CHLORHEXIDINE GLUCONATE 1 APPLICATION(S): 213 SOLUTION TOPICAL at 11:34

## 2019-02-09 RX ADMIN — Medication 650 MILLIGRAM(S): at 21:21

## 2019-02-09 RX ADMIN — Medication 325 MILLIGRAM(S): at 11:34

## 2019-02-09 NOTE — PROGRESS NOTE ADULT - SUBJECTIVE AND OBJECTIVE BOX
Pt seen and examined at bedside. Denies any complaints.     VITAL SIGNS (Last 24 hrs):  T(C): 35.7 (02-09-19 @ 06:12), Max: 36.1 (02-08-19 @ 21:29)  HR: 64 (02-09-19 @ 06:12) (64 - 66)  BP: 159/70 (02-09-19 @ 06:12) (116/63 - 172/80)  RR: 16 (02-09-19 @ 06:12) (16 - 16)  SpO2: --  Wt(kg): --  Daily     Daily     I&O's Summary    08 Feb 2019 07:01  -  09 Feb 2019 07:00  --------------------------------------------------------  IN: 229 mL / OUT: 1850 mL / NET: -1621 mL        PHYSICAL EXAM:  GENERAL: NAD, well-developed  HEAD:  Atraumatic, Normocephalic  EYES: EOMI, PERRLA, conjunctiva and sclera clear  NECK: Supple, No JVD  CHEST/LUNG: Clear to auscultation bilaterally; No wheeze  HEART: Regular rate and rhythm; No murmurs, rubs, or gallops  ABDOMEN: Soft, Nontender, Nondistended; Bowel sounds present  +dressing c/d/i   EXTREMITIES:  2+ Peripheral Pulses, No clubbing, cyanosis, or edema  SKIN: No rashes or lesions    Labs Reviewed  Spoke to patient in regards to abnormal labs.    CBC Full  -  ( 09 Feb 2019 07:48 )  WBC Count : 5.82 K/uL  Hemoglobin : 8.1 g/dL  Hematocrit : 25.5 %  Platelet Count - Automated : 168 K/uL  Mean Cell Volume : 95.5 fL  Mean Cell Hemoglobin : 30.3 pg  Mean Cell Hemoglobin Concentration : 31.8 g/dL  Auto Neutrophil # : x  Auto Lymphocyte # : x  Auto Monocyte # : x  Auto Eosinophil # : x  Auto Basophil # : x  Auto Neutrophil % : x  Auto Lymphocyte % : x  Auto Monocyte % : x  Auto Eosinophil % : x  Auto Basophil % : x    BMP:    02-09 @ 07:48    Blood Urea Nitrogen - 46  Calcium - 8.1  Carbond Dioxide - 18  Chloride - 114  Creatinine - 2.6  Glucose - 99  Potassium - 5.3  Sodium - 140      Hemoglobin A1c -     Urine Culture:  02-06 @ 14:30 Urine culture: --    Culture Results:   >100,000 CFU/ml Gram Negative Rods  Method Type: --  Organism: --  Organism Identification: --  Specimen Source: .Urine Clean Catch (Midstream)  02-06 @ 11:45 Urine culture: --    Culture Results:   No growth to date.  Method Type: --  Organism: --  Organism Identification: --  Specimen Source: .Blood Blood       MEDICATIONS  (STANDING):  cefTRIAXone   IVPB      cefTRIAXone   IVPB 1 Gram(s) IV Intermittent every 24 hours  chlorhexidine 4% Liquid 1 Application(s) Topical <User Schedule>  ferrous    sulfate 325 milliGRAM(s) Oral daily  finasteride 5 milliGRAM(s) Oral daily  heparin  Injectable 5000 Unit(s) SubCutaneous every 12 hours  sodium bicarbonate 650 milliGRAM(s) Oral three times a day  sodium chloride 0.45%. 1000 milliLiter(s) (75 mL/Hr) IV Continuous <Continuous>  tamsulosin 0.4 milliGRAM(s) Oral at bedtime    MEDICATIONS  (PRN):

## 2019-02-10 LAB
-  AMIKACIN: SIGNIFICANT CHANGE UP
-  AMPICILLIN/SULBACTAM: SIGNIFICANT CHANGE UP
-  CEFEPIME: SIGNIFICANT CHANGE UP
-  CEFTAZIDIME: SIGNIFICANT CHANGE UP
-  CEFTRIAXONE: SIGNIFICANT CHANGE UP
-  CIPROFLOXACIN: SIGNIFICANT CHANGE UP
-  GENTAMICIN: SIGNIFICANT CHANGE UP
-  LEVOFLOXACIN: SIGNIFICANT CHANGE UP
-  MEROPENEM: SIGNIFICANT CHANGE UP
-  TOBRAMYCIN: SIGNIFICANT CHANGE UP
-  TRIMETHOPRIM/SULFAMETHOXAZOLE: SIGNIFICANT CHANGE UP
ANION GAP SERPL CALC-SCNC: 10 MMOL/L — SIGNIFICANT CHANGE UP (ref 7–14)
BUN SERPL-MCNC: 38 MG/DL — HIGH (ref 10–20)
CALCIUM SERPL-MCNC: 8.2 MG/DL — LOW (ref 8.5–10.1)
CHLORIDE SERPL-SCNC: 110 MMOL/L — SIGNIFICANT CHANGE UP (ref 98–110)
CO2 SERPL-SCNC: 18 MMOL/L — SIGNIFICANT CHANGE UP (ref 17–32)
CREAT SERPL-MCNC: 2.6 MG/DL — HIGH (ref 0.7–1.5)
FERRITIN SERPL-MCNC: 143 NG/ML — SIGNIFICANT CHANGE UP (ref 30–400)
FOLATE SERPL-MCNC: 10.4 NG/ML — SIGNIFICANT CHANGE UP
GLUCOSE SERPL-MCNC: 86 MG/DL — SIGNIFICANT CHANGE UP (ref 70–99)
METHOD TYPE: SIGNIFICANT CHANGE UP
POTASSIUM SERPL-MCNC: 5.1 MMOL/L — HIGH (ref 3.5–5)
POTASSIUM SERPL-SCNC: 5.1 MMOL/L — HIGH (ref 3.5–5)
SODIUM SERPL-SCNC: 138 MMOL/L — SIGNIFICANT CHANGE UP (ref 135–146)
VIT B12 SERPL-MCNC: 378 PG/ML — SIGNIFICANT CHANGE UP (ref 232–1245)

## 2019-02-10 RX ADMIN — Medication 325 MILLIGRAM(S): at 11:26

## 2019-02-10 RX ADMIN — SODIUM CHLORIDE 75 MILLILITER(S): 9 INJECTION, SOLUTION INTRAVENOUS at 11:28

## 2019-02-10 RX ADMIN — Medication 650 MILLIGRAM(S): at 14:01

## 2019-02-10 RX ADMIN — HEPARIN SODIUM 5000 UNIT(S): 5000 INJECTION INTRAVENOUS; SUBCUTANEOUS at 05:40

## 2019-02-10 RX ADMIN — CHLORHEXIDINE GLUCONATE 1 APPLICATION(S): 213 SOLUTION TOPICAL at 05:40

## 2019-02-10 RX ADMIN — HEPARIN SODIUM 5000 UNIT(S): 5000 INJECTION INTRAVENOUS; SUBCUTANEOUS at 17:19

## 2019-02-10 RX ADMIN — Medication 650 MILLIGRAM(S): at 21:49

## 2019-02-10 RX ADMIN — Medication 650 MILLIGRAM(S): at 05:40

## 2019-02-10 RX ADMIN — FINASTERIDE 5 MILLIGRAM(S): 5 TABLET, FILM COATED ORAL at 11:26

## 2019-02-10 RX ADMIN — TAMSULOSIN HYDROCHLORIDE 0.4 MILLIGRAM(S): 0.4 CAPSULE ORAL at 21:49

## 2019-02-10 RX ADMIN — CEFTRIAXONE 100 GRAM(S): 500 INJECTION, POWDER, FOR SOLUTION INTRAMUSCULAR; INTRAVENOUS at 23:46

## 2019-02-10 NOTE — PROGRESS NOTE ADULT - SUBJECTIVE AND OBJECTIVE BOX
Pt seen and examined at bedside.    VITAL SIGNS (Last 24 hrs):  T(C): 36.3 (02-10-19 @ 05:47), Max: 36.6 (02-09-19 @ 14:00)  HR: 71 (02-10-19 @ 05:47) (67 - 73)  BP: 130/64 (02-10-19 @ 05:47) (130/64 - 183/86)  RR: 18 (02-10-19 @ 05:47) (16 - 18)  SpO2: --  Wt(kg): --  Daily     Daily     I&O's Summary    09 Feb 2019 07:01  -  10 Feb 2019 07:00  --------------------------------------------------------  IN: 240 mL / OUT: 1950 mL / NET: -1710 mL        PHYSICAL EXAM:  GENERAL: NAD, well-developed  HEAD:  Atraumatic, Normocephalic  EYES: EOMI, PERRLA, conjunctiva and sclera clear  NECK: Supple, No JVD  CHEST/LUNG: Clear to auscultation bilaterally; No wheeze  HEART: Regular rate and rhythm; No murmurs, rubs, or gallops  ABDOMEN: Soft, Nontender, Nondistended; Bowel sounds present  +dressing c/d/i  EXTREMITIES:  2+ Peripheral Pulses, No clubbing, cyanosis, or edema  PSYCH: alert, awake   SKIN: No rashes or lesions    Labs Reviewed  Spoke to patient in regards to abnormal labs.    CBC Full  -  ( 09 Feb 2019 07:48 )  WBC Count : 5.82 K/uL  Hemoglobin : 8.1 g/dL  Hematocrit : 25.5 %  Platelet Count - Automated : 168 K/uL  Mean Cell Volume : 95.5 fL  Mean Cell Hemoglobin : 30.3 pg  Mean Cell Hemoglobin Concentration : 31.8 g/dL  Auto Neutrophil # : x  Auto Lymphocyte # : x  Auto Monocyte # : x  Auto Eosinophil # : x  Auto Basophil # : x  Auto Neutrophil % : x  Auto Lymphocyte % : x  Auto Monocyte % : x  Auto Eosinophil % : x  Auto Basophil % : x    BMP:    02-10 @ 07:43    Blood Urea Nitrogen - 38  Calcium - 8.2  Carbond Dioxide - 18  Chloride - 110  Creatinine - 2.6  Glucose - 86  Potassium - 5.1  Sodium - 138      Hemoglobin A1c -     Urine Culture:  02-06 @ 14:30 Urine culture: --    Culture Results:   >100,000 CFU/ml Gram Negative Rods  Method Type: --  Organism: --  Organism Identification: --  Specimen Source: .Urine Clean Catch (Midstream)  02-06 @ 11:45 Urine culture: --    Culture Results:   No growth to date.  Method Type: --  Organism: --  Organism Identification: --  Specimen Source: .Blood Blood       MEDICATIONS  (STANDING):  cefTRIAXone   IVPB      cefTRIAXone   IVPB 1 Gram(s) IV Intermittent every 24 hours  chlorhexidine 4% Liquid 1 Application(s) Topical <User Schedule>  ferrous    sulfate 325 milliGRAM(s) Oral daily  finasteride 5 milliGRAM(s) Oral daily  heparin  Injectable 5000 Unit(s) SubCutaneous every 12 hours  sodium bicarbonate 650 milliGRAM(s) Oral three times a day  sodium chloride 0.45%. 1000 milliLiter(s) (75 mL/Hr) IV Continuous <Continuous>  tamsulosin 0.4 milliGRAM(s) Oral at bedtime    MEDICATIONS  (PRN):

## 2019-02-11 LAB
-  IMIPENEM: SIGNIFICANT CHANGE UP
-  PIPERACILLIN/TAZOBACTAM: SIGNIFICANT CHANGE UP
CULTURE RESULTS: SIGNIFICANT CHANGE UP
METHOD TYPE: SIGNIFICANT CHANGE UP
ORGANISM # SPEC MICROSCOPIC CNT: SIGNIFICANT CHANGE UP
SPECIMEN SOURCE: SIGNIFICANT CHANGE UP

## 2019-02-11 RX ORDER — IRON SUCROSE 20 MG/ML
200 INJECTION, SOLUTION INTRAVENOUS ONCE
Qty: 0 | Refills: 0 | Status: COMPLETED | OUTPATIENT
Start: 2019-02-11 | End: 2019-02-11

## 2019-02-11 RX ADMIN — Medication 325 MILLIGRAM(S): at 11:30

## 2019-02-11 RX ADMIN — Medication 650 MILLIGRAM(S): at 05:58

## 2019-02-11 RX ADMIN — Medication 650 MILLIGRAM(S): at 22:32

## 2019-02-11 RX ADMIN — Medication 1 TABLET(S): at 14:13

## 2019-02-11 RX ADMIN — IRON SUCROSE 110 MILLIGRAM(S): 20 INJECTION, SOLUTION INTRAVENOUS at 14:12

## 2019-02-11 RX ADMIN — HEPARIN SODIUM 5000 UNIT(S): 5000 INJECTION INTRAVENOUS; SUBCUTANEOUS at 05:57

## 2019-02-11 RX ADMIN — CHLORHEXIDINE GLUCONATE 1 APPLICATION(S): 213 SOLUTION TOPICAL at 05:57

## 2019-02-11 RX ADMIN — Medication 650 MILLIGRAM(S): at 14:13

## 2019-02-11 RX ADMIN — TAMSULOSIN HYDROCHLORIDE 0.4 MILLIGRAM(S): 0.4 CAPSULE ORAL at 22:32

## 2019-02-11 RX ADMIN — FINASTERIDE 5 MILLIGRAM(S): 5 TABLET, FILM COATED ORAL at 11:30

## 2019-02-11 NOTE — CONSULT NOTE ADULT - ASSESSMENT
89 yo M presents for obstructed chronic qureshi with subsequent development of GUERITA on CKD IV.    Cystitis secondary to A. Baumannii   -Continue Bactrim   -Continue qureshi care with irrigation PRN  -Continue Proscar/ Flomax     Dementia    GUERITA on CKD IV improving  -Nephrology following    JOSE  -Continue Iron supplementation  -Monitor CBC    Disposition: Awaiting placement 89 yo M presents for obstructed chronic qureshi with subsequent development of GUERITA on CKD IV.    Cystitis secondary to A. Baumannii   -Continue Bactrim   -Continue qureshi care with irrigation PRN  -Continue Proscar/ Flomax     Dementia without behavioral issues   -Reorientation    GUERITA on CKD IV improving  -Nephrology following    JOSE  -Continue Iron supplementation  -Monitor CBC    Disposition: Awaiting placement 89 yo M presents for obstructed chronic qureshi with subsequent development of GUERITA on CKD IV.    Cystitis secondary to A. Baumannii   -Continue Bactrim, consider daily prophylactic antibiotic   -Continue qureshi care with irrigation PRN  -Would consider discontinuing Proscar and Flomax     Dementia without behavioral issues   -Reorientation    GUERITA on CKD IV improving  -Nephrology following    JOSE  -Continue Iron supplementation  -Monitor CBC    Would consider consulting palliative care to discuss goals of care     Disposition: Awaiting placement 89 yo M presents for obstructed chronic qureshi with subsequent development of GUERITA on CKD IV.    Cystitis secondary to A. Baumannii   -Continue Bactrim, consider daily prophylactic antibiotic   -Continue qureshi care with irrigation PRN  -Would consider discontinuing Proscar and Flomax, given that patient has indwelling urinary catether     Dementia without behavioral issues, mild stage possibly delirium   -Reorientation needed, OOB, d/c as soon as possible    GUERITA on CKD IV improving  -Nephrology following    JOSE  -Continue Iron supplementation  -Monitor CBC    Would consider consulting palliative care and dission on goals of care     Disposition: Awaiting placement, may consider Home Visit Program

## 2019-02-11 NOTE — PROGRESS NOTE ADULT - SUBJECTIVE AND OBJECTIVE BOX
Pt seen and examined at bedside. No events overnight. No CP, SOB.     VITAL SIGNS (Last 24 hrs):  T(C): 36.6 (02-11-19 @ 05:11), Max: 36.6 (02-11-19 @ 05:11)  HR: 74 (02-11-19 @ 05:11) (67 - 74)  BP: 132/62 (02-11-19 @ 05:11) (132/62 - 156/89)  RR: 18 (02-11-19 @ 05:11) (18 - 18)  SpO2: --  Wt(kg): --  Daily     Daily     I&O's Summary    10 Feb 2019 07:01  -  11 Feb 2019 07:00  --------------------------------------------------------  IN: 240 mL / OUT: 2200 mL / NET: -1960 mL        PHYSICAL EXAM:  GENERAL: NAD  HEAD:  Atraumatic, Normocephalic  EYES: conjunctiva and sclera clear  NECK: Supple, No JVD  CHEST/LUNG: Clear to auscultation bilaterally; No wheeze  HEART: Regular rate and rhythm; No murmurs, rubs, or gallops  ABDOMEN: Soft, Nontender, Nondistended; Bowel sounds present  EXTREMITIES:  2+ Peripheral Pulses, No clubbing, cyanosis, or edema  SKIN: No rashes or lesions    Labs Reviewed  Spoke to patient in regards to abnormal labs.    CBC Full  -  ( 09 Feb 2019 07:48 )  WBC Count : 5.82 K/uL  Hemoglobin : 8.1 g/dL  Hematocrit : 25.5 %  Platelet Count - Automated : 168 K/uL  Mean Cell Volume : 95.5 fL  Mean Cell Hemoglobin : 30.3 pg  Mean Cell Hemoglobin Concentration : 31.8 g/dL  Auto Neutrophil # : x  Auto Lymphocyte # : x  Auto Monocyte # : x  Auto Eosinophil # : x  Auto Basophil # : x  Auto Neutrophil % : x  Auto Lymphocyte % : x  Auto Monocyte % : x  Auto Eosinophil % : x  Auto Basophil % : x    BMP:    02-10 @ 07:43    Blood Urea Nitrogen - 38  Calcium - 8.2  Carbon Dioxide - 18  Chloride - 110  Creatinine - 2.6  Glucose - 86  Potassium - 5.1  Sodium - 138    Urine Culture:  02-06 @ 14:30 Urine culture: --    Culture Results:   >100,000 CFU/ml Acinetobacter baumannii  Method Type: PRESLEY  Organism: Acinetobacter baumannii  Organism Identification: Acinetobacter baumannii  Specimen Source: .Urine Clean Catch (Midstream)  02-06 @ 11:45 Urine culture: --    Culture Results:   No growth to date.  Method Type: --  Organism: --  Organism Identification: --  Specimen Source: .Blood Blood      MEDICATIONS  (STANDING):  cefTRIAXone   IVPB      cefTRIAXone   IVPB 1 Gram(s) IV Intermittent every 24 hours  chlorhexidine 4% Liquid 1 Application(s) Topical <User Schedule>  ferrous    sulfate 325 milliGRAM(s) Oral daily  finasteride 5 milliGRAM(s) Oral daily  heparin  Injectable 5000 Unit(s) SubCutaneous every 12 hours  sodium bicarbonate 650 milliGRAM(s) Oral three times a day  tamsulosin 0.4 milliGRAM(s) Oral at bedtime    MEDICATIONS  (PRN):

## 2019-02-11 NOTE — PHARMACOTHERAPY INTERVENTION NOTE - COMMENTS
CrCl=14.7ml/min. Informed MD as per  labeling bactrim contraindicated in Crcl less then 15ml/min. Md will dc it

## 2019-02-11 NOTE — CONSULT NOTE ADULT - SUBJECTIVE AND OBJECTIVE BOX
HPI: 89 yo M with PMHc of Dementia, Iron Deficiency Anemia, Colon Cancer with colostomy and enterocutaneous fistula, Prostate Cancer, CKD IV, Urinary Retention with chronic qureshi presents with complaint of decreased qureshi output associated with abdominal pain. In ED, patient's qureshi was changed with 700cc of initial output.     Today is hospital day 6. Patient seen and examined.       Chief Complaint:  Patient is a 89y old  Male who presents with a chief complaint of abdominal pain, clogged Qureshi (08 Feb 2019 08:37)    Past Medical and Surgical History:  PAST MEDICAL & SURGICAL HISTORY:  Dementia  JOSE (iron deficiency anemia)  Colon cancer  Prostate cancer  CKD (chronic kidney disease)  Enlarged prostate  Colostomy care  History of coronary artery stent placement  H/O exploratory laparotomy  Abdominal fistula  Parastomal hernia  Incisional hernia      Current Medications:  MEDICATIONS  (STANDING):  chlorhexidine 4% Liquid 1 Application(s) Topical <User Schedule>  ferrous    sulfate 325 milliGRAM(s) Oral daily  finasteride 5 milliGRAM(s) Oral daily  heparin  Injectable 5000 Unit(s) SubCutaneous every 12 hours  sodium bicarbonate 650 milliGRAM(s) Oral three times a day  tamsulosin 0.4 milliGRAM(s) Oral at bedtime  trimethoprim  160 mG/sulfamethoxazole 800 mG 1 Tablet(s) Oral every 12 hours        Interval History:  No acute events overnight. No complaints at this time.    Vital Signs:  T(F): 97.9 (02-11-19 @ 05:11), Max: 97.9 (02-11-19 @ 05:11)  HR: 74 (02-11-19 @ 05:11) (67 - 74)  BP: 132/62 (02-11-19 @ 05:11) (130/64 - 183/86)  RR: 18 (02-11-19 @ 05:11) (16 - 18)  SpO2: --  CAPILLARY BLOOD GLUCOSE        Physical Exam:  General: Not in distress.   HEENT: Moist mucus membranes. PERRLA.  Cardio: Regular rate and rhythm, S1, S2, no murmur, rub, or gallop.  Pulm: Clear to auscultation bilaterally. No wheezing, rales, or rhonchi.  Abdomen: Soft, non-tender, non-distended. Normoactive bowel sounds.  Extremities: No cyanosis or edema bilaterally. No calf tenderness to palpation.  Neuro: A&O x1    Labs and Imaging:    RDW:               Cardiac Enzymes:    Urinalysis:    Cultures:     (collected 02-06-19 @ 14:30)  Source: .Urine Clean Catch (Midstream)  Preliminary Report:    >100,000 CFU/ml Acinetobacter baumannii  Organism: Acinetobacter baumannii  Organism: Acinetobacter baumannii     (collected 02-06-19 @ 11:45)  Source: .Blood Blood  Preliminary Report:    No growth to date.     (collected 02-06-19 @ 11:45)  Source: .Blood Blood  Preliminary Report:    No growth to date.        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 In ED, patient's qureshi was changed with 700cc of initial output.     Today is hospital day 6. Patient seen and examined, is pleasant alert and oriented to self only. Home attendant present by bedside, patient lives with his son Vinod. Patient denies any complaints at this time.      Chief Complaint:  Patient is a 89y old  Male who presents with a chief complaint of abdominal pain, clogged Qureshi (08 Feb 2019 08:37)    Past Medical and Surgical History:  PAST MEDICAL & SURGICAL HISTORY:  Dementia  JOSE (iron deficiency anemia)  Colon cancer  Prostate cancer  CKD (chronic kidney disease)  Enlarged prostate  Colostomy care  History of coronary artery stent placement  H/O exploratory laparotomy  Abdominal fistula  Parastomal hernia  Incisional hernia      Current Medications:  MEDICATIONS  (STANDING):  chlorhexidine 4% Liquid 1 Application(s) Topical <User Schedule>  ferrous    sulfate 325 milliGRAM(s) Oral daily  finasteride 5 milliGRAM(s) Oral daily  heparin  Injectable 5000 Unit(s) SubCutaneous every 12 hours  sodium bicarbonate 650 milliGRAM(s) Oral three times a day  tamsulosin 0.4 milliGRAM(s) Oral at bedtime  trimethoprim  160 mG/sulfamethoxazole 800 mG 1 Tablet(s) Oral every 12 hours        Interval History:  No acute events overnight. No complaints at this time.    Vital Signs:  T(F): 97.9 (02-11-19 @ 05:11), Max: 97.9 (02-11-19 @ 05:11)  HR: 74 (02-11-19 @ 05:11) (67 - 74)  BP: 132/62 (02-11-19 @ 05:11) (130/64 - 183/86)  RR: 18 (02-11-19 @ 05:11) (16 - 18)  SpO2: --  CAPILLARY BLOOD GLUCOSE        Physical Exam:  General: Not in distress.   HEENT: Moist mucus membranes. PERRLA.  Cardio: Regular rate and rhythm, S1, S2, no murmur, rub, or gallop.  Pulm: Clear to auscultation bilaterally. No wheezing, rales, or rhonchi.  Abdomen: Soft, non-tender, non-distended. Dressing over fistula, colostomy intact with stool output.   Extremities: No cyanosis or edema bilaterally. No calf tenderness to palpation.  Neuro: A&O x1    Labs and Imaging:    RDW:               Cardiac Enzymes:    Urinalysis:    Cultures:     (collected 02-06-19 @ 14:30)  Source: .Urine Clean Catch (Midstream)  Preliminary Report:    >100,000 CFU/ml Acinetobacter baumannii  Organism: Acinetobacter baumannii  Organism: Acinetobacter baumannii     (collected 02-06-19 @ 11:45)  Source: .Blood Blood  Preliminary Report:    No growth to date.     (collected 02-06-19 @ 11:45)  Source: .Blood Blood  Preliminary Report:    No growth to date.        Home Medications:  Home Medications:  ferrous sulfate 325 mg (65 mg elemental iron) oral tablet:  (06 Feb 2019 15:15)  finasteride:  (06 Feb 2019 15:15)  furosemide 20 mg oral tablet: 1 tab(s) orally once a day (06 Feb 2019 15:15)  tamsulosin 0.4 mg oral capsule: 1 cap(s) orally once a day (06 Feb 2019 15:15) HPI: 89 yo M with PMHc of Dementia, Iron Deficiency Anemia, Colon Cancer with colostomy and enterocutaneous fistula, Prostate Cancer, CKD IV, Urinary Retention with chronic qureshi presents with complaint of decreased qureshi output associated with abdominal pain. In ED, patient's qureshi was changed with 700cc of initial output.     Today is hospital day 6. Patient seen and examined, is pleasant alert and oriented initially to self only, but when prompted able to provide details of social and medical history. Home attendant present by bedside, patient lives with his son Vindo. Patient denies any complaints at this time.      Chief Complaint:  Patient is a 89y old  Male who presents with a chief complaint of abdominal pain, clogged Qureshi (08 Feb 2019 08:37)    Past Medical and Surgical History:  PAST MEDICAL & SURGICAL HISTORY:  Dementia  JOSE (iron deficiency anemia)  Colon cancer  Prostate cancer  CKD (chronic kidney disease)  Enlarged prostate  Colostomy care  History of coronary artery stent placement  H/O exploratory laparotomy  Abdominal fistula  Parastomal hernia  Incisional hernia      Current Medications:  MEDICATIONS  (STANDING):  chlorhexidine 4% Liquid 1 Application(s) Topical <User Schedule>  ferrous    sulfate 325 milliGRAM(s) Oral daily  finasteride 5 milliGRAM(s) Oral daily  heparin  Injectable 5000 Unit(s) SubCutaneous every 12 hours  sodium bicarbonate 650 milliGRAM(s) Oral three times a day  tamsulosin 0.4 milliGRAM(s) Oral at bedtime  trimethoprim  160 mG/sulfamethoxazole 800 mG 1 Tablet(s) Oral every 12 hours        Interval History:  No acute events overnight. No complaints at this time.    Vital Signs:  T(F): 97.9 (02-11-19 @ 05:11), Max: 97.9 (02-11-19 @ 05:11)  HR: 74 (02-11-19 @ 05:11) (67 - 74)  BP: 132/62 (02-11-19 @ 05:11) (130/64 - 183/86)  RR: 18 (02-11-19 @ 05:11) (16 - 18)  SpO2: --  CAPILLARY BLOOD GLUCOSE        Physical Exam:  General: Not in distress.   HEENT: Moist mucus membranes. PERRLA.  Cardio: Regular rate and rhythm, S1, S2, no murmur, rub, or gallop.  Pulm: Clear to auscultation bilaterally. No wheezing, rales, or rhonchi.  Abdomen: Soft, non-tender, non-distended. Dressing over fistula, colostomy intact with stool output.   Extremities: No cyanosis or edema bilaterally. No calf tenderness to palpation.  Neuro: A&O x1    Labs and Imaging:      Cultures:     (collected 02-06-19 @ 14:30)  Source: .Urine Clean Catch (Midstream)  Preliminary Report:    >100,000 CFU/ml Acinetobacter baumannii  Organism: Acinetobacter baumannii  Organism: Acinetobacter baumannii     (collected 02-06-19 @ 11:45)  Source: .Blood Blood  Preliminary Report:    No growth to date.     (collected 02-06-19 @ 11:45)  Source: .Blood Blood  Preliminary Report:    No growth to date.        Home Medications:  Home Medications:  ferrous sulfate 325 mg (65 mg elemental iron) oral tablet:  (06 Feb 2019 15:15)  finasteride:  (06 Feb 2019 15:15)  furosemide 20 mg oral tablet: 1 tab(s) orally once a day (06 Feb 2019 15:15)  tamsulosin 0.4 mg oral capsule: 1 cap(s) orally once a day (06 Feb 2019 15:15)

## 2019-02-11 NOTE — CONSULT NOTE ADULT - ATTENDING COMMENTS
Patient seen and examined with surgery PA on rounds and discussed management plans for outpatient care. patient sitting in chair with minimal mobility and some dementia..
editied above  would advise palliative care and MOLST to be signed prior d/c

## 2019-02-11 NOTE — PROGRESS NOTE ADULT - SUBJECTIVE AND OBJECTIVE BOX
Nephrology progress note    Patient is seen and examined, events over the last 24 h noted .  Good appetite, no complaints  Allergies:  No Known Allergies    Hospital Medications:   MEDICATIONS  (STANDING):  cefTRIAXone   IVPB      cefTRIAXone   IVPB 1 Gram(s) IV Intermittent every 24 hours  chlorhexidine 4% Liquid 1 Application(s) Topical <User Schedule>  ferrous    sulfate 325 milliGRAM(s) Oral daily  finasteride 5 milliGRAM(s) Oral daily  heparin  Injectable 5000 Unit(s) SubCutaneous every 12 hours  sodium bicarbonate 650 milliGRAM(s) Oral three times a day  tamsulosin 0.4 milliGRAM(s) Oral at bedtime        VITALS:  T(F): 97.9 (19 @ 05:11), Max: 97.9 (19 @ 05:11)  HR: 74 (19 @ 05:11)  BP: 132/62 (19 @ 05:11)  RR: 18 (19 @ 05:11)  SpO2: --  Wt(kg): --     @ 07:  -  02-10 @ 07:00  --------------------------------------------------------  IN: 240 mL / OUT: 1950 mL / NET: -1710 mL    02-10 @ 07:01  -  11 @ 07:00  --------------------------------------------------------  IN: 240 mL / OUT: 2200 mL / NET: -1960 mL          PHYSICAL EXAM:  Constitutional: NAD  HEENT: anicteric sclera, oropharynx clear, MMM  Neck: No JVD  Respiratory: CTAB, no wheezes, rales or rhonchi  Cardiovascular: S1, S2, RRR  Gastrointestinal: BS+, soft, NT/ND  Extremities: No cyanosis or clubbing. No peripheral edema  Neurological: A/O x 3, no focal deficits  : No CVA tenderness.  qureshi+.   Skin: No rashes  Vascular Access:    LABS:  02-10    138  |  110  |  38<H>  ----------------------------<  86  5.1<H>   |  18  |  2.6<H>    Ca    8.2<L>      10 Feb 2019 07:43          Urine Studies:  Urinalysis Basic - ( 2019 14:30 )    Color: Yellow / Appearance: Cloudy / S.020 / pH:   Gluc:  / Ketone: Negative  / Bili: Negative / Urobili: 0.2 mg/dL   Blood:  / Protein: 100 mg/dL / Nitrite: Positive   Leuk Esterase: Large / RBC:  / WBC >50 /HPF   Sq Epi:  / Non Sq Epi: Occasional /HPF / Bacteria: TNTC /HPF        RADIOLOGY & ADDITIONAL STUDIES:

## 2019-02-11 NOTE — PROGRESS NOTE ADULT - ASSESSMENT
89/M with GUERITA on CKD 4, BPH/Urinary retention, Indwelling Guillen, Colon Ca, s/p colostomy admitted with abdominal pain, due to clogged Guillen catheter, found to have GUERITA, cystitis on CT. Needs placement as per son's request.    GUERITA due to dehydration   -creat close to baseline, no need for IVF, good po intake  - no hydro on CT, but cystitis noted  CKD - baseline creat ~ 2.5 mg%    Hyperkalemia - please follow strict diet to 2 gr K    Metabolic Acidosis - start NA bicarb 650 po tid  due to CKD    Indwelling Guillen - UTI   on Rocephine, f/u UCx  CT showed cystitis    Anemia -  iron stores with iron deficiency  -start Venofer 200 mg IV x3 doses    Awaiting placement    Will follow

## 2019-02-12 LAB
CULTURE RESULTS: SIGNIFICANT CHANGE UP
CULTURE RESULTS: SIGNIFICANT CHANGE UP
SPECIMEN SOURCE: SIGNIFICANT CHANGE UP
SPECIMEN SOURCE: SIGNIFICANT CHANGE UP

## 2019-02-12 RX ADMIN — Medication 325 MILLIGRAM(S): at 13:10

## 2019-02-12 RX ADMIN — Medication 650 MILLIGRAM(S): at 06:50

## 2019-02-12 RX ADMIN — Medication 1 TABLET(S): at 18:04

## 2019-02-12 RX ADMIN — Medication 650 MILLIGRAM(S): at 21:32

## 2019-02-12 RX ADMIN — HEPARIN SODIUM 5000 UNIT(S): 5000 INJECTION INTRAVENOUS; SUBCUTANEOUS at 06:51

## 2019-02-12 RX ADMIN — FINASTERIDE 5 MILLIGRAM(S): 5 TABLET, FILM COATED ORAL at 13:10

## 2019-02-12 RX ADMIN — Medication 1 TABLET(S): at 06:50

## 2019-02-12 RX ADMIN — TAMSULOSIN HYDROCHLORIDE 0.4 MILLIGRAM(S): 0.4 CAPSULE ORAL at 21:32

## 2019-02-12 RX ADMIN — CHLORHEXIDINE GLUCONATE 1 APPLICATION(S): 213 SOLUTION TOPICAL at 06:50

## 2019-02-12 RX ADMIN — Medication 650 MILLIGRAM(S): at 13:09

## 2019-02-12 RX ADMIN — HEPARIN SODIUM 5000 UNIT(S): 5000 INJECTION INTRAVENOUS; SUBCUTANEOUS at 18:04

## 2019-02-12 NOTE — PROVIDER CONTACT NOTE (OTHER) - SITUATION
Stoma color changes from last night. Stoma was red upon last night assessment; noted to be pale pink this morning upon ostomy care. Area around/underneath noted to be swollen.

## 2019-02-12 NOTE — PROGRESS NOTE ADULT - SUBJECTIVE AND OBJECTIVE BOX
Nephrology progress note    Patient is seen and examined, events over the last 24 h noted .  Good appetite, no SOB  Allergies:  No Known Allergies    Hospital Medications:   MEDICATIONS  (STANDING):  amoxicillin  875 milliGRAM(s)/clavulanate 1 Tablet(s) Oral two times a day  chlorhexidine 4% Liquid 1 Application(s) Topical <User Schedule>  ferrous    sulfate 325 milliGRAM(s) Oral daily  finasteride 5 milliGRAM(s) Oral daily  heparin  Injectable 5000 Unit(s) SubCutaneous every 12 hours  sodium bicarbonate 650 milliGRAM(s) Oral three times a day  tamsulosin 0.4 milliGRAM(s) Oral at bedtime        VITALS:  T(F): 97.3 (19 @ 06:00), Max: 98.2 (19 @ 14:02)  HR: 66 (19 @ 06:00)  BP: 124/62 (19 @ 06:00)  RR: 16 (19 @ 06:00)  SpO2: --  Wt(kg): --    02-10 @ 07:  -  11 @ 07:00  --------------------------------------------------------  IN: 240 mL / OUT: 2200 mL / NET: -1960 mL     @ 07:01  -  12 @ 07:00  --------------------------------------------------------  IN: 0 mL / OUT: 1750 mL / NET: -1750 mL          PHYSICAL EXAM:  Constitutional: NAD  HEENT: anicteric sclera, oropharynx clear, MMM  Neck: No JVD  Respiratory: CTAB, no wheezes, rales or rhonchi  Cardiovascular: S1, S2, RRR  Gastrointestinal: BS+, soft, NT/ND  Extremities:No peripheral edema  Neurological: Awake alert  : No CVA tenderness.  qureshi+.   Skin: No rashes  Vascular Access:    LABS:            Urine Studies:  Urinalysis Basic - ( 2019 14:30 )    Color: Yellow / Appearance: Cloudy / S.020 / pH:   Gluc:  / Ketone: Negative  / Bili: Negative / Urobili: 0.2 mg/dL   Blood:  / Protein: 100 mg/dL / Nitrite: Positive   Leuk Esterase: Large / RBC:  / WBC >50 /HPF   Sq Epi:  / Non Sq Epi: Occasional /HPF / Bacteria: TNTC /HPF        RADIOLOGY & ADDITIONAL STUDIES:

## 2019-02-12 NOTE — PROGRESS NOTE ADULT - ASSESSMENT
89/M with GUERITA on CKD 4, BPH/Urinary retention, Indwelling Guillen, Colon Ca, s/p colostomy admitted with abdominal pain, due to clogged Guillen catheter, found to have GUERITA, cystitis on CT. Needs placement as per son's request.    GUERITA no recent labs, last creat 2.6 on 2/10, K 5.1  -creat close to baseline, no need for IVF, good po intake  - no hydro on CT, but cystitis noted  CKD - baseline creat ~ 2.5 mg%    Hyperkalemia - please follow strict diet to 2 gr K    Metabolic Acidosis - start NA bicarb 650 po tid  due to CKD    Anemia -  iron stores with iron deficiency  -start Venofer 200 mg IV x3 doses  - may give Procrit 10 Units q week on d/c until HB is 10 and hold    Indwelling Guillen - UTI    Awaiting placement  Call as needed  OP renal f/u in 2 weeks      Awaiting placement    Will follow

## 2019-02-13 RX ADMIN — Medication 325 MILLIGRAM(S): at 11:49

## 2019-02-13 RX ADMIN — TAMSULOSIN HYDROCHLORIDE 0.4 MILLIGRAM(S): 0.4 CAPSULE ORAL at 22:26

## 2019-02-13 RX ADMIN — FINASTERIDE 5 MILLIGRAM(S): 5 TABLET, FILM COATED ORAL at 11:48

## 2019-02-13 RX ADMIN — HEPARIN SODIUM 5000 UNIT(S): 5000 INJECTION INTRAVENOUS; SUBCUTANEOUS at 17:33

## 2019-02-13 RX ADMIN — Medication 650 MILLIGRAM(S): at 13:59

## 2019-02-13 RX ADMIN — Medication 1 TABLET(S): at 05:32

## 2019-02-13 RX ADMIN — HEPARIN SODIUM 5000 UNIT(S): 5000 INJECTION INTRAVENOUS; SUBCUTANEOUS at 05:32

## 2019-02-13 RX ADMIN — Medication 650 MILLIGRAM(S): at 05:32

## 2019-02-13 RX ADMIN — Medication 1 TABLET(S): at 17:33

## 2019-02-13 RX ADMIN — Medication 650 MILLIGRAM(S): at 22:26

## 2019-02-13 NOTE — PROGRESS NOTE ADULT - PROBLEM SELECTOR PROBLEM 5
JOSE (iron deficiency anemia)
Cystitis
JOSE (iron deficiency anemia)

## 2019-02-13 NOTE — PROGRESS NOTE ADULT - PROBLEM SELECTOR PROBLEM 3
Enlarged prostate
Social problem

## 2019-02-13 NOTE — PROGRESS NOTE ADULT - PROBLEM SELECTOR PROBLEM 2
CKD (chronic kidney disease)

## 2019-02-13 NOTE — PROGRESS NOTE ADULT - PROBLEM SELECTOR PROBLEM 6
Enterocutaneous fistula

## 2019-02-13 NOTE — PROGRESS NOTE ADULT - SUBJECTIVE AND OBJECTIVE BOX
Pt seen and examined at bedside. Denies any complaints.     VITAL SIGNS (Last 24 hrs):  T(C): 36.1 (02-13-19 @ 06:48), Max: 36.1 (02-13-19 @ 06:48)  HR: 73 (02-13-19 @ 06:48) (70 - 73)  BP: 131/79 (02-13-19 @ 06:48) (131/79 - 188/86)  RR: 16 (02-13-19 @ 06:48) (16 - 16)  SpO2: --  Wt(kg): --  Daily     Daily     I&O's Summary    12 Feb 2019 07:01  -  13 Feb 2019 07:00  --------------------------------------------------------  IN: 0 mL / OUT: 951 mL / NET: -951 mL        PHYSICAL EXAM:  GENERAL: NAD   HEAD:  Atraumatic, Normocephalic  EYES: EOMI, PERRLA, conjunctiva and sclera clear  NECK: Supple, No JVD  CHEST/LUNG: Clear to auscultation bilaterally; No wheeze  HEART: Regular rate and rhythm; No murmurs, rubs, or gallops  ABDOMEN: Soft, Nontender, Nondistended; Bowel sounds present +abd wound dressing c/d/i   EXTREMITIES:  2+ Peripheral Pulses, No clubbing, cyanosis, or edema  PSYCH: AAOx3  NEUROLOGY: non-focal  SKIN: No rashes or lesions    Labs Reviewed  Spoke to patient in regards to abnormal labs.      BMP:    02-10 @ 07:43    Blood Urea Nitrogen - 38  Calcium - 8.2  Carbon Dioxide - 18  Chloride - 110  Creatinine - 2.6  Glucose - 86  Potassium - 5.1  Sodium - 138       MEDICATIONS  (STANDING):  amoxicillin  875 milliGRAM(s)/clavulanate 1 Tablet(s) Oral two times a day  chlorhexidine 4% Liquid 1 Application(s) Topical <User Schedule>  ferrous    sulfate 325 milliGRAM(s) Oral daily  finasteride 5 milliGRAM(s) Oral daily  heparin  Injectable 5000 Unit(s) SubCutaneous every 12 hours  sodium bicarbonate 650 milliGRAM(s) Oral three times a day  tamsulosin 0.4 milliGRAM(s) Oral at bedtime    MEDICATIONS  (PRN):

## 2019-02-13 NOTE — PROGRESS NOTE ADULT - PROBLEM SELECTOR PLAN 2
GUERITA on CKDIV  c/w IVF for now  c/w ceftriaxone
GEURITA on CKDIV  c/w IVF for now  c/w ceftriaxone, F/U URINE CULTURE
GUERITA on CKDIV  c/w IVF for now  c/w ceftriaxone, F/U URINE CULTURE
GUERITA on CKDIV  c/w IVF for now  c/w ceftriaxone, F/U URINE CULTURE
GUERITA on CKDIV - resolved   c/w sodium bicarbonate tabs

## 2019-02-13 NOTE — PROGRESS NOTE ADULT - PROBLEM SELECTOR PLAN 4
continue usual meds and Guillen
on iron supplement
continue usual meds and Guillen
on iron supplement
on iron supplement

## 2019-02-13 NOTE — PROGRESS NOTE ADULT - PROBLEM SELECTOR PROBLEM 7
Colostomy care

## 2019-02-13 NOTE — PROGRESS NOTE ADULT - PROBLEM SELECTOR PLAN 1
c/w abx
c/w abx  f/u culture
c/w abx to complete 7 days
needs placement
needs placement
c/w abx  f/u culture
needs placement

## 2019-02-13 NOTE — PROGRESS NOTE ADULT - PROBLEM SELECTOR PLAN 5
on iron supplement
c/w abx  f/u culture
on iron supplement

## 2019-02-13 NOTE — PROGRESS NOTE ADULT - PROBLEM SELECTOR PLAN 8
outpatient f/u

## 2019-02-13 NOTE — PROGRESS NOTE ADULT - PROBLEM SELECTOR PROBLEM 8
Abdominal aortic aneurysm (AAA) greater than 5.5 cm in diameter in male

## 2019-02-13 NOTE — PROGRESS NOTE ADULT - PROBLEM SELECTOR PLAN 3
continue usual meds and Guillen
needs placement

## 2019-02-13 NOTE — PROGRESS NOTE ADULT - PROBLEM SELECTOR PROBLEM 4
Enlarged prostate
JOSE (iron deficiency anemia)
Enlarged prostate
JOSE (iron deficiency anemia)
JOSE (iron deficiency anemia)

## 2019-02-13 NOTE — PROGRESS NOTE ADULT - PROBLEM SELECTOR PLAN 6
outpatient f/u with surgery
outpatient f/u with surgery  Aquacel for wound care BID
outpatient f/u with surgery  Keron for wound care BID

## 2019-02-14 RX ADMIN — FINASTERIDE 5 MILLIGRAM(S): 5 TABLET, FILM COATED ORAL at 11:32

## 2019-02-14 RX ADMIN — Medication 650 MILLIGRAM(S): at 13:15

## 2019-02-14 RX ADMIN — TAMSULOSIN HYDROCHLORIDE 0.4 MILLIGRAM(S): 0.4 CAPSULE ORAL at 22:01

## 2019-02-14 RX ADMIN — Medication 325 MILLIGRAM(S): at 11:32

## 2019-02-14 RX ADMIN — HEPARIN SODIUM 5000 UNIT(S): 5000 INJECTION INTRAVENOUS; SUBCUTANEOUS at 17:15

## 2019-02-14 RX ADMIN — HEPARIN SODIUM 5000 UNIT(S): 5000 INJECTION INTRAVENOUS; SUBCUTANEOUS at 05:51

## 2019-02-14 RX ADMIN — Medication 1 TABLET(S): at 17:15

## 2019-02-14 RX ADMIN — Medication 650 MILLIGRAM(S): at 22:01

## 2019-02-14 RX ADMIN — Medication 650 MILLIGRAM(S): at 05:51

## 2019-02-14 RX ADMIN — Medication 1 TABLET(S): at 05:51

## 2019-02-14 NOTE — DIETITIAN INITIAL EVALUATION ADULT. - OTHER INFO
Reason for assessment: LOS. PMH: CKD (baseline cr ~2.5), enlarged prostate, JOSE, prostate CA, abdominal fistula, laparotomy, stent placement. Pt presented to ED for abdominal pain and difficulty urinating. Hyperkalemia noted (5.1). Pt is admitted due to placement. Pt unsure of last BM. Colostomy. Abdomen noted as soft/nontender/nondistended + BS. NKFA.

## 2019-02-14 NOTE — PROGRESS NOTE ADULT - ASSESSMENT
Assessment and Plan:   Problem/Plan - 1:  ·  Problem: Cystitis.  Plan: c/w abx to complete 7 days.     Problem/Plan - 2:  ·  Problem: CKD (chronic kidney disease).  Plan: GUERITA on CKDIV - resolved   c/w sodium bicarbonate tabs.     Problem/Plan - 3:  ·  Problem: Social problem.  Plan: needs placement.     Problem/Plan - 4:  ·  Problem: Enlarged prostate.  Plan: continue usual meds and Guillen.     Problem/Plan - 5:  ·  Problem: JOSE (iron deficiency anemia).  Plan: on iron supplement.     Problem/Plan - 6:  Problem: Enterocutaneous fistula. Plan: outpatient f/u with surgery  Aquacel for wound care BID.    Problem/Plan - 7:  ·  Problem: Colostomy care.     Problem/Plan - 8:  ·  Problem: Abdominal aortic aneurysm (AAA) greater than 5.5 cm in diameter in male.  Plan: outpatient f/u. 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 Patient was seen by Dr. Garces in August for incisional hernia, parastomal hernia, and exploratory laparotomy, and instructed to follow up with Dr. Moraes. According to the son patient does not complain often so they sent him to the ED due to complaints of abdominal pain and minimal urine in the bag. While in the ER his qureshi was changed and 700 ml urine obtained. Also was seen by surgical team, apparently reduced parastomal hernia with no further intervention needed. He was admitted for further monitoring.    Assessment and Plan:    1. Cystitis:  CT:  Diffuse wall thickening and perivesicular fat stranding of the urinary bladder most consistent with cystitis.   UA and Cultures positive for Acinetobacter Plan.  Patient to complete 7 days of antibiotics. STOP 2/18/19.        2. GUERITA on CKD 4:  CKD - baseline creat ~ 2.5 mg%.  No hydro on CT. GUERITA Resolved.  Continue Sodium bicarbonate tablets due to metabolic acidosis associated with CKD.  Low potassium diet         3. Enlarged prostate with Urinary Retention:  Continue Qureshi.  Finasteride and Tamsulosin.         4. JOSE (iron deficiency anemia):  Continue Iron supplement.         5. Enterocutaneous fistula:  Patient seen by surgery. Out patient follow up with Dr. Moraes.  Local wound care.        6. Abdominal aortic aneurysm (AAA):  CT: greater than 5.5 cm in diameter in male.  Measured 5.7 cm (previously 5.2 cm in 2015).   Out patient follow up with Vascular.      7. Chronic Anemia:  - check iron stores  may need Procrit vs Iron  DVT prophylaxis: Heparin.  Disposition:  Short Term Rehab in Skilled Nursing Facility 89 yo Male, hard of hearing, with PMH of CKD, Dementia, incisional hernia, prostate cancer (in remission), colon cancer (in remission), cardiac stent, JOSE, s/p colostomy, draining enterocutaneous fistula (following surgical procedures) and chronic indwelling Qureshi due to urinary retention presented to the ED c/o abdominal pain and poor urine output. Patient was seen by Dr. Garces in August for incisional hernia, parastomal hernia, and exploratory laparotomy, and instructed to follow up with Dr. Moraes. According to the son patient does not complain often so they sent him to the ED due to complaints of abdominal pain and minimal urine in the bag. While in the ER his qureshi was changed and 700 ml urine obtained. Also was seen by surgical team, apparently reduced parastomal hernia with no further intervention needed. He was admitted for further monitoring.    Assessment and Plan:    1. Cystitis:  CT:  Diffuse wall thickening and perivesicular fat stranding of the urinary bladder most consistent with cystitis.   UA and Cultures positive for Acinetobacter Plan.  Patient to complete 7 days of antibiotics. STOP 2/18/19.        2. GUERITA on CKD 4:  CKD - baseline creat ~ 2.5 mg%.  No hydro on CT. GUERITA Resolved.  Continue Sodium bicarbonate tablets due to metabolic acidosis associated with CKD.  Low potassium diet. Follow up with Nephrology outpatient.        3. Enlarged prostate with Urinary Retention:  Continue Qureshi.  Finasteride and Tamsulosin.         4. Chronic Anemia:  Iron levels not consistent with JOSE.  Will need Procrit.        5. Enterocutaneous fistula:  Patient seen by surgery. Out patient follow up with Dr. Moraes.  Local wound care.        6. Abdominal aortic aneurysm (AAA):  CT: greater than 5.5 cm in diameter in male.  Measured 5.7 cm (previously 5.2 cm in 2015).   Out patient follow up with Vascular.          DVT prophylaxis: Heparin.  Disposition:  Short Term Rehab in Skilled Nursing Facility

## 2019-02-14 NOTE — DIETITIAN INITIAL EVALUATION ADULT. - ENERGY NEEDS
Calories: 1188-1546kcals/day (MSJ A.F 1.2-1.3)   Protein: 58-70g/day (1-1.2g/kg)     Fluid: 1:1ml/kcal

## 2019-02-15 ENCOUNTER — TRANSCRIPTION ENCOUNTER (OUTPATIENT)
Age: 84
End: 2019-02-15

## 2019-02-15 VITALS
HEART RATE: 74 BPM | TEMPERATURE: 96 F | RESPIRATION RATE: 16 BRPM | SYSTOLIC BLOOD PRESSURE: 167 MMHG | DIASTOLIC BLOOD PRESSURE: 88 MMHG

## 2019-02-15 RX ORDER — SODIUM BICARBONATE 1 MEQ/ML
1 SYRINGE (ML) INTRAVENOUS
Qty: 0 | Refills: 0 | COMMUNITY
Start: 2019-02-15

## 2019-02-15 RX ORDER — FUROSEMIDE 40 MG
1 TABLET ORAL
Qty: 0 | Refills: 0 | COMMUNITY

## 2019-02-15 RX ADMIN — Medication 1 TABLET(S): at 05:57

## 2019-02-15 RX ADMIN — HEPARIN SODIUM 5000 UNIT(S): 5000 INJECTION INTRAVENOUS; SUBCUTANEOUS at 17:01

## 2019-02-15 RX ADMIN — Medication 325 MILLIGRAM(S): at 13:05

## 2019-02-15 RX ADMIN — FINASTERIDE 5 MILLIGRAM(S): 5 TABLET, FILM COATED ORAL at 13:05

## 2019-02-15 RX ADMIN — HEPARIN SODIUM 5000 UNIT(S): 5000 INJECTION INTRAVENOUS; SUBCUTANEOUS at 05:57

## 2019-02-15 RX ADMIN — Medication 650 MILLIGRAM(S): at 05:57

## 2019-02-15 RX ADMIN — CHLORHEXIDINE GLUCONATE 1 APPLICATION(S): 213 SOLUTION TOPICAL at 05:57

## 2019-02-15 RX ADMIN — Medication 1 TABLET(S): at 17:02

## 2019-02-15 RX ADMIN — Medication 650 MILLIGRAM(S): at 13:05

## 2019-02-15 NOTE — DISCHARGE NOTE ADULT - CARE PROVIDER_API CALL
Wilver Moraes)  Surgery  [address] [place] [address]  Phone: [phone]  Fax: [phone]  Follow Up Time:     PMD,   Phone: (   )    -  Fax: (   )    -  Follow Up Time:     Urologist,   Phone: (   )    -  Fax: (   )    -  Follow Up Time:     Rochelle Osullivan)  Nephrology  [address] [place] [address]  Phone: [phone]  Fax: [phone]  Follow Up Time:

## 2019-02-15 NOTE — PROGRESS NOTE ADULT - PROVIDER SPECIALTY LIST ADULT
Hospitalist
Hospitalist
Internal Medicine
Nephrology
Nephrology
Internal Medicine
Internal Medicine

## 2019-02-15 NOTE — DISCHARGE NOTE ADULT - OTHER SIGNIFICANT FINDINGS
Complete Blood Count in AM (02.09.19 @ 07:48)    Nucleated RBC: 0 /100 WBCs    WBC Count: 5.82 K/uL    RBC Count: 2.67 M/uL    Hemoglobin: 8.1 g/dL    Hematocrit: 25.5 %    Mean Cell Volume: 95.5 fL    Mean Cell Hemoglobin: 30.3 pg    Mean Cell Hemoglobin Conc: 31.8 g/dL    Red Cell Distrib Width: 14.3 %    Platelet Count - Automated: 168 K/uL    Comprehensive Metabolic Panel (02.06.19 @ 11:45)    Sodium, Serum: 139 mmol/L    Potassium, Serum: 5.3: Hemolyzed. Interpret with caution mmol/L    Chloride, Serum: 110 mmol/L    Carbon Dioxide, Serum: 16 mmol/L    Anion Gap, Serum: 13 mmol/L    Blood Urea Nitrogen, Serum: 68: Critical value:notified PORTILLO Parada 02/06/19 13:41 mg/dL    Creatinine, Serum: 3.5 mg/dL    Glucose, Serum: 93 mg/dL    Calcium, Total Serum: 8.5 mg/dL    Protein Total, Serum: 6.6 g/dL    Albumin, Serum: 3.4 g/dL    Bilirubin Total, Serum: 0.3 mg/dL    Alkaline Phosphatase, Serum: 94 U/L    Aspartate Aminotransferase (AST/SGOT): 21: Hemolyzed. Interpret with caution U/L    Alanine Aminotransferase (ALT/SGPT): 15: Hemolyzed. Interpret with caution U/L    eGFR if Non African American: 14 mL/min/1.73M2      Iron with Total Binding Capacity in AM (02.09.19 @ 07:48)    Iron - Total Binding Capacity.: 169 ug/dL    % Saturation, Iron: 17 %    Iron Total, Serum: 29 ug/dL    Unsaturated Iron Binding Capacity: 140 ug/dL      MICROBIOLOGY:   Culture - Urine (02.06.19 @ 14:30)    -  Amikacin: S <=16    -  Ampicillin/Sulbactam: S <=8/4    -  Cefepime: I 16    -  Ceftazidime: R >16    -  Ceftriaxone: R >32    -  Ciprofloxacin: R >2    -  Gentamicin: S <=4    -  Imipenem: S    -  Levofloxacin: R >4    -  Meropenem: S 2    -  Piperacillin/Tazobactam: R    -  Tobramycin: S <=4    -  Trimethoprim/Sulfamethoxazole: S <=2/38    Specimen Source: .Urine Clean Catch (Midstream)    Culture Results:   >100,000 CFU/ml Acinetobacter baumannii    Organism Identification: Acinetobacter baumannii  Acinetobacter baumannii    Organism: Acinetobacter baumannii    Organism: Acinetobacter baumannii    Method Type: PRESLEY    Method Type:         Culture - Blood (02.06.19 @ 11:45)    Specimen Source: .Blood Blood    Culture Results:   No growth at 5 days.      RADIOLOGY & ADDITIONAL TESTS:  CT Abdomen and Pelvis No Cont (02.06.19 @ 16:50)   Diffuse wall thickening and perivesicular fat stranding of   the urinary bladder, most consistent with cystitis. Correlate with   urinalysis.    Guillen catheter in place.    Stable appearance of postoperative changes of the large bowel and small   bowel, with left lower quadrant ostomy, and a parastomal hernia   containing nonobstructive small bowel loops.    Stable appearance of ventral hernia containing multiple loops of   non-obstructed small bowel.    Again noted post endograft repair of infrarenal abdominal aortic aneurysm   with interval further mild dilatation of the native aneurysm sac   measuring 5.7 cm (previously 5.2 cm in 2015).      X-ray Chest 1 View-PORTABLE IMMEDIATE (02.06.19 @ 10:48)   Left basilar discoid atelectasis/scarring.  Vertically orientated reticular densities, peripheral right lung field,   likely scarring..    No consolidation effusion or pneumothorax

## 2019-02-15 NOTE — PROGRESS NOTE ADULT - SUBJECTIVE AND OBJECTIVE BOX
PALOMOSUSANAJENNIFER  89y  Male    Patient is a 89y old Male who presents with a chief complaint of decreased qureshi output and abdominal pain (11 Feb 2019 10:51)      INTERVAL HPI/OVERNIGHT EVENTS:  No interval events.   Patient has no complaints.  No abdominal pain. Urine output adequate.      REVIEW OF SYSTEMS:  CONSTITUTIONAL: No fever or fatigue  EYES: No eye pain, visual disturbances, or discharge  ENMT:  No difficulty hearing, tinnitus, vertigo; No sinus or throat pain  NECK: No pain or stiffness  RESPIRATORY: No cough, wheezing, chills or hemoptysis; No shortness of breath  CARDIOVASCULAR: No chest pain, palpitations, dizziness, or leg swelling  GASTROINTESTINAL: No abdominal or epigastric pain. No nausea, vomiting, or hematemesis; No diarrhea or constipation. No melena or hematochezia.  GENITOURINARY: No dysuria, frequency, hematuria, or incontinence  NEUROLOGICAL: No headaches, memory loss, loss of strength, numbness, or tremors  MUSCULOSKELETAL: No joint pain or swelling; No muscle, back, or extremity pain  PSYCHIATRIC: No depression, anxiety, mood swings, or difficulty sleeping      VITALS:  T(C): 35.9 (15 Feb 2019 06:04), Max: 36.2 (14 Feb 2019 14:00)  T(F): 96.7 (15 Feb 2019 06:04), Max: 97.2 (14 Feb 2019 14:00)  HR: 72 (15 Feb 2019 06:04) (68 - 72)  BP: 141/64 (15 Feb 2019 06:04) (141/64 - 162/80)  BP(mean): --  RR: 16 (15 Feb 2019 06:04) (16 - 16)  SpO2: --      PHYSICAL EXAM:  GENERAL: NAD, well-developed  HEAD:  Atraumatic, Normocephalic  EYES: conjunctiva and sclera clear  ENMT: Moist mucous membranes  NECK: Supple, Normal thyroid  NERVOUS SYSTEM:  Awake & Alert, Moves all extremities  CHEST/LUNG: Clear to auscultation, bilaterally; No rales, rhonchi, wheezing, or rubs  HEART: Regular rate and rhythm; No murmurs, rubs, or gallops  ABDOMEN: Soft, Nontender, Nondistended; Bowel sounds present, left sided colostomy with Loose stool.  Anterior abdominal wall dressing with erythema and stool soaked dressing.  EXTREMITIES:  2+ Peripheral Pulses, No clubbing, cyanosis, or edema  LYMPH: No lymphadenopathy noted      Consultant(s) Notes Reviewed:  [x ] YES  [ ] NO  Care Discussed with Consultants/Other Providers [ x] YES  [ ] NO      LABS: No new labs since 2/9.    Iron with Total Binding Capacity in AM (02.09.19 @ 07:48)    Iron - Total Binding Capacity.: 169 ug/dL    % Saturation, Iron: 17 %    Iron Total, Serum: 29 ug/dL    Unsaturated Iron Binding Capacity: 140 ug/dL      MICROBIOLOGY:   Culture - Urine (02.06.19 @ 14:30)    -  Amikacin: S <=16    -  Ampicillin/Sulbactam: S <=8/4    -  Cefepime: I 16    -  Ceftazidime: R >16    -  Ceftriaxone: R >32    -  Ciprofloxacin: R >2    -  Gentamicin: S <=4    -  Imipenem: S    -  Levofloxacin: R >4    -  Meropenem: S 2    -  Piperacillin/Tazobactam: R    -  Tobramycin: S <=4    -  Trimethoprim/Sulfamethoxazole: S <=2/38    Specimen Source: .Urine Clean Catch (Midstream)    Culture Results:   >100,000 CFU/ml Acinetobacter baumannii    Organism Identification: Acinetobacter baumannii  Acinetobacter baumannii    Organism: Acinetobacter baumannii    Organism: Acinetobacter baumannii    Method Type: PRESLEY    Method Type: KB        Culture - Blood (02.06.19 @ 11:45)    Specimen Source: .Blood Blood    Culture Results:   No growth at 5 days.      Culture - Blood (02.06.19 @ 11:45)    Specimen Source: .Blood Blood    Culture Results:   No growth at 5 days.      RADIOLOGY & ADDITIONAL TESTS:  CT Abdomen and Pelvis No Cont (02.06.19 @ 16:50)   Diffuse wall thickening and perivesicular fat stranding of   the urinary bladder, most consistent with cystitis. Correlate with   urinalysis.    Qureshi catheter in place.    Stable appearance of postoperative changes of the large bowel and small   bowel, with left lower quadrant ostomy, and a parastomal hernia   containing nonobstructive small bowel loops.    Stable appearance of ventral hernia containing multiple loops of   non-obstructed small bowel.    Again noted post endograft repair of infrarenal abdominal aortic aneurysm   with interval further mild dilatation of the native aneurysm sac   measuring 5.7 cm (previously 5.2 cm in 2015).      X-ray Chest 1 View-PORTABLE IMMEDIATE (02.06.19 @ 10:48)   Left basilar discoid atelectasis/scarring.  Vertically orientated reticular densities, peripheral right lung field,   likely scarring..    No consolidation effusion or pneumothorax          Imaging Personally Reviewed:  [x] YES  [ ] NO    MEDICATIONS  (STANDING):  amoxicillin  875 milliGRAM(s)/clavulanate 1 Tablet(s) Oral two times a day  chlorhexidine 4% Liquid 1 Application(s) Topical <User Schedule>  ferrous    sulfate 325 milliGRAM(s) Oral daily  finasteride 5 milliGRAM(s) Oral daily  heparin  Injectable 5000 Unit(s) SubCutaneous every 12 hours  sodium bicarbonate 650 milliGRAM(s) Oral three times a day  tamsulosin 0.4 milliGRAM(s) Oral at bedtime    MEDICATIONS  (PRN): None        HEALTH ISSUES - PROBLEM Dx:  Abdominal aortic aneurysm (AAA) greater than 5.5 cm in diameter in male  Colostomy care  Enterocutaneous fistula  Cystitis  JOSE (iron deficiency anemia)  Enlarged prostate  CKD (chronic kidney disease)  Social problem

## 2019-02-15 NOTE — DISCHARGE NOTE ADULT - HOSPITAL COURSE
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 Patient was seen by Dr. Garces in August for incisional hernia, parastomal hernia, and exploratory laparotomy, and instructed to follow up with Dr. Moraes. According to the son patient does not complain often so they sent him to the ED due to complaints of abdominal pain and minimal urine in the bag. While in the ER his qureshi was changed and 700 ml urine obtained. Also was seen by surgical team, apparently reduced parastomal hernia with no further intervention needed. He was admitted for further monitoring.    Assessment and Plan:    1. Cystitis:  CT:  Diffuse wall thickening and perivesicular fat stranding of the urinary bladder most consistent with cystitis.   UA and Cultures positive for Acinetobacter Plan.  Patient to complete 7 days of antibiotics. STOP 2/18/19.        2. GUERITA on CKD 4:  CKD - baseline creat ~ 2.5 mg%.  No hydro on CT. GUERITA Resolved.  Continued Sodium bicarbonate tablets due to metabolic acidosis associated with CKD.  Low potassium diet. Follow up with Nephrology outpatient.        3. Enlarged prostate with Urinary Retention:  Continued Qureshi.  Finasteride and Tamsulosin.         4. Chronic Anemia:  Iron levels not consistent with JOSE.  Will need Procrit.        5. Enterocutaneous fistula:  Patient seen by surgery. Out patient follow up with Dr. Moraes.  Local wound care.        6. Abdominal aortic aneurysm (AAA):  CT: greater than 5.5 cm in diameter in male.  Measured 5.7 cm (previously 5.2 cm in 2015).   Out patient follow up with Vascular.

## 2019-02-15 NOTE — DISCHARGE NOTE ADULT - CARE PROVIDERS DIRECT ADDRESSES
[email or web address],DirectAddress_Unknown,DirectAddress_Unknown,DirectAddress_Unknown

## 2019-02-15 NOTE — DISCHARGE NOTE ADULT - CARE PLAN
Principal Discharge DX:	Cystitis  Goal:	Treatment  Assessment and plan of treatment:	CT:  Diffuse wall thickening and perivesicular fat stranding of the urinary bladder most consistent with cystitis.   UA and Cultures positive for Acinetobacter.  Patient to complete 7 days of antibiotics. STOP 2/18/19.  Secondary Diagnosis:	Abdominal fistula  Goal:	Support care  Assessment and plan of treatment:	Patient seen by surgery. Out patient follow up with Dr. Moraes after discharge form Rehab.  No surgical intervention needed now.  Local wound care as often as needed. Clean with Saline, apply Aquacel and ABD pad. Reinforce dressing with Microfoam tape.  Secondary Diagnosis:	CKD (chronic kidney disease)  Goal:	Monitoring  Assessment and plan of treatment:	CKD - baseline creat ~ 2.5 mg%.  No hydro on CT. GUERITA Resolved.  Continue Sodium bicarbonate tablets due to metabolic acidosis associated with CKD.  Low potassium diet. Follow up with Nephrology outpatient.  May need Procrit for Anemia.  Secondary Diagnosis:	Dementia  Goal:	Supportive care.  Assessment and plan of treatment:	Supportive care  Secondary Diagnosis:	Abdominal aortic aneurysm (AAA) greater than 5.5 cm in diameter in male  Goal:	Follow up.  Assessment and plan of treatment:	CT: greater than 5.5 cm in diameter in male.  Measured 5.7 cm (previously 5.2 cm in 2015).   Out patient follow up with Vascular.

## 2019-02-15 NOTE — DISCHARGE NOTE ADULT - SECONDARY DIAGNOSIS.
Abdominal fistula CKD (chronic kidney disease) Dementia Abdominal aortic aneurysm (AAA) greater than 5.5 cm in diameter in male

## 2019-02-15 NOTE — DISCHARGE NOTE ADULT - PATIENT PORTAL LINK FT
You can access the [facility] Patient Portal, offered by [facility], by registering with the following website: [email or web address]

## 2019-02-15 NOTE — PROGRESS NOTE ADULT - ASSESSMENT
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 Patient was seen by Dr. Garces in August for incisional hernia, parastomal hernia, and exploratory laparotomy, and instructed to follow up with Dr. Moraes. According to the son patient does not complain often so they sent him to the ED due to complaints of abdominal pain and minimal urine in the bag. While in the ER his qureshi was changed and 700 ml urine obtained. Also was seen by surgical team, apparently reduced parastomal hernia with no further intervention needed. He was admitted for further monitoring.    Assessment and Plan:    1. Cystitis:  CT:  Diffuse wall thickening and perivesicular fat stranding of the urinary bladder most consistent with cystitis.   UA and Cultures positive for Acinetobacter Plan.  Patient to complete 7 days of antibiotics. STOP 2/18/19.        2. GUERITA on CKD 4:  CKD - baseline creat ~ 2.5 mg%.  No hydro on CT. GUERITA Resolved.  Continued Sodium bicarbonate tablets due to metabolic acidosis associated with CKD.  Low potassium diet. Follow up with Nephrology outpatient.        3. Enlarged prostate with Urinary Retention:  Continued Qureshi.  Finasteride and Tamsulosin.         4. Chronic Anemia:  Iron levels not consistent with JOSE.  Will need Procrit.        5. Enterocutaneous fistula:  Patient seen by surgery. Out patient follow up with Dr. Moraes.  Local wound care.        6. Abdominal aortic aneurysm (AAA):  CT: greater than 5.5 cm in diameter in male.  Measured 5.7 cm (previously 5.2 cm in 2015).   Out patient follow up with Vascular.          DVT prophylaxis: Heparin.  Disposition:  Short Term Rehab in Skilled Nursing Facility

## 2019-02-15 NOTE — DISCHARGE NOTE ADULT - PLAN OF CARE
Treatment CT:  Diffuse wall thickening and perivesicular fat stranding of the urinary bladder most consistent with cystitis.   UA and Cultures positive for Acinetobacter.  Patient to complete 7 days of antibiotics. STOP 2/18/19. Support care Patient seen by surgery. Out patient follow up with Dr. Moraes after discharge form Rehab.  No surgical intervention needed now.  Local wound care as often as needed. Clean with Saline, apply Aquacel and ABD pad. Reinforce dressing with Microfoam tape. Monitoring CKD - baseline creat ~ 2.5 mg%.  No hydro on CT. GUERITA Resolved.  Continue Sodium bicarbonate tablets due to metabolic acidosis associated with CKD.  Low potassium diet. Follow up with Nephrology outpatient.  May need Procrit for Anemia. Supportive care. Supportive care Follow up. CT: greater than 5.5 cm in diameter in male.  Measured 5.7 cm (previously 5.2 cm in 2015).   Out patient follow up with Vascular.

## 2019-02-15 NOTE — DISCHARGE NOTE ADULT - PROVIDER TOKENS
PROVIDER:[TOKEN:[[id number]]],FREE:[LAST:[PMD],PHONE:[(   )    -],FAX:[(   )    -]],FREE:[LAST:[Urologist],PHONE:[(   )    -],FAX:[(   )    -]],PROVIDER:[TOKEN:[[id number]]]

## 2019-02-18 ENCOUNTER — OUTPATIENT (OUTPATIENT)
Dept: OUTPATIENT SERVICES | Facility: HOSPITAL | Age: 84
LOS: 1 days | Discharge: HOME | End: 2019-02-18

## 2019-02-18 DIAGNOSIS — Z95.5 PRESENCE OF CORONARY ANGIOPLASTY IMPLANT AND GRAFT: Chronic | ICD-10-CM

## 2019-02-18 DIAGNOSIS — K43.2 INCISIONAL HERNIA WITHOUT OBSTRUCTION OR GANGRENE: Chronic | ICD-10-CM

## 2019-02-18 DIAGNOSIS — K43.5 PARASTOMAL HERNIA WITHOUT OBSTRUCTION OR GANGRENE: Chronic | ICD-10-CM

## 2019-02-18 DIAGNOSIS — Z98.890 OTHER SPECIFIED POSTPROCEDURAL STATES: Chronic | ICD-10-CM

## 2019-02-18 DIAGNOSIS — K63.2 FISTULA OF INTESTINE: Chronic | ICD-10-CM

## 2019-02-19 PROBLEM — F03.90 UNSPECIFIED DEMENTIA WITHOUT BEHAVIORAL DISTURBANCE: Chronic | Status: ACTIVE | Noted: 2019-02-06

## 2019-02-19 PROBLEM — C18.9 MALIGNANT NEOPLASM OF COLON, UNSPECIFIED: Chronic | Status: ACTIVE | Noted: 2019-02-06

## 2019-02-19 PROBLEM — C61 MALIGNANT NEOPLASM OF PROSTATE: Chronic | Status: ACTIVE | Noted: 2019-02-06

## 2019-02-19 PROBLEM — N18.9 CHRONIC KIDNEY DISEASE, UNSPECIFIED: Chronic | Status: ACTIVE | Noted: 2019-02-06

## 2019-02-19 PROBLEM — D50.9 IRON DEFICIENCY ANEMIA, UNSPECIFIED: Chronic | Status: ACTIVE | Noted: 2019-02-06

## 2019-02-20 DIAGNOSIS — R76.11 NONSPECIFIC REACTION TO TUBERCULIN SKIN TEST WITHOUT ACTIVE TUBERCULOSIS: ICD-10-CM

## 2019-03-04 ENCOUNTER — APPOINTMENT (OUTPATIENT)
Dept: SURGERY | Facility: CLINIC | Age: 84
End: 2019-03-04

## 2019-04-09 ENCOUNTER — APPOINTMENT (OUTPATIENT)
Dept: OTOLARYNGOLOGY | Facility: CLINIC | Age: 84
End: 2019-04-09

## 2019-06-07 ENCOUNTER — APPOINTMENT (OUTPATIENT)
Dept: OTOLARYNGOLOGY | Facility: CLINIC | Age: 84
End: 2019-06-07

## 2019-06-08 ENCOUNTER — OUTPATIENT (OUTPATIENT)
Dept: OUTPATIENT SERVICES | Facility: HOSPITAL | Age: 84
LOS: 1 days | Discharge: HOME | End: 2019-06-08

## 2019-06-08 DIAGNOSIS — Z98.890 OTHER SPECIFIED POSTPROCEDURAL STATES: Chronic | ICD-10-CM

## 2019-06-08 DIAGNOSIS — Z95.5 PRESENCE OF CORONARY ANGIOPLASTY IMPLANT AND GRAFT: Chronic | ICD-10-CM

## 2019-06-08 DIAGNOSIS — K43.5 PARASTOMAL HERNIA WITHOUT OBSTRUCTION OR GANGRENE: Chronic | ICD-10-CM

## 2019-06-08 DIAGNOSIS — K43.2 INCISIONAL HERNIA WITHOUT OBSTRUCTION OR GANGRENE: Chronic | ICD-10-CM

## 2019-06-08 DIAGNOSIS — K63.2 FISTULA OF INTESTINE: Chronic | ICD-10-CM

## 2019-06-10 ENCOUNTER — OUTPATIENT (OUTPATIENT)
Dept: OUTPATIENT SERVICES | Facility: HOSPITAL | Age: 84
LOS: 1 days | Discharge: HOME | End: 2019-06-10

## 2019-06-10 DIAGNOSIS — R06.00 DYSPNEA, UNSPECIFIED: ICD-10-CM

## 2019-06-10 DIAGNOSIS — K43.5 PARASTOMAL HERNIA WITHOUT OBSTRUCTION OR GANGRENE: Chronic | ICD-10-CM

## 2019-06-10 DIAGNOSIS — Z95.5 PRESENCE OF CORONARY ANGIOPLASTY IMPLANT AND GRAFT: Chronic | ICD-10-CM

## 2019-06-10 DIAGNOSIS — K63.2 FISTULA OF INTESTINE: Chronic | ICD-10-CM

## 2019-06-10 DIAGNOSIS — K43.2 INCISIONAL HERNIA WITHOUT OBSTRUCTION OR GANGRENE: Chronic | ICD-10-CM

## 2019-06-10 DIAGNOSIS — I50.9 HEART FAILURE, UNSPECIFIED: ICD-10-CM

## 2019-06-10 DIAGNOSIS — Z98.890 OTHER SPECIFIED POSTPROCEDURAL STATES: Chronic | ICD-10-CM

## 2019-06-12 ENCOUNTER — OUTPATIENT (OUTPATIENT)
Dept: OUTPATIENT SERVICES | Facility: HOSPITAL | Age: 84
LOS: 1 days | Discharge: HOME | End: 2019-06-12

## 2019-06-12 DIAGNOSIS — K43.5 PARASTOMAL HERNIA WITHOUT OBSTRUCTION OR GANGRENE: Chronic | ICD-10-CM

## 2019-06-12 DIAGNOSIS — K63.2 FISTULA OF INTESTINE: Chronic | ICD-10-CM

## 2019-06-12 DIAGNOSIS — I10 ESSENTIAL (PRIMARY) HYPERTENSION: ICD-10-CM

## 2019-06-12 DIAGNOSIS — N25.89 OTHER DISORDERS RESULTING FROM IMPAIRED RENAL TUBULAR FUNCTION: ICD-10-CM

## 2019-06-12 DIAGNOSIS — D64.9 ANEMIA, UNSPECIFIED: ICD-10-CM

## 2019-06-12 DIAGNOSIS — Z95.5 PRESENCE OF CORONARY ANGIOPLASTY IMPLANT AND GRAFT: Chronic | ICD-10-CM

## 2019-06-12 DIAGNOSIS — Z98.890 OTHER SPECIFIED POSTPROCEDURAL STATES: Chronic | ICD-10-CM

## 2019-06-12 DIAGNOSIS — K43.2 INCISIONAL HERNIA WITHOUT OBSTRUCTION OR GANGRENE: Chronic | ICD-10-CM

## 2022-10-25 NOTE — PROGRESS NOTE ADULT - SUBJECTIVE AND OBJECTIVE BOX
Pt seen and examined at bedside. Denies any complaints. No SOB, CP.     VITAL SIGNS (Last 24 hrs):  T(C): 36.3 (02-12-19 @ 06:00), Max: 36.8 (02-11-19 @ 14:02)  HR: 66 (02-12-19 @ 06:00) (66 - 68)  BP: 124/62 (02-12-19 @ 06:00) (124/62 - 188/78)  RR: 16 (02-12-19 @ 06:00) (16 - 16)  SpO2: --  Wt(kg): --  Daily     Daily     I&O's Summary    11 Feb 2019 07:01  -  12 Feb 2019 07:00  --------------------------------------------------------  IN: 0 mL / OUT: 1750 mL / NET: -1750 mL      PHYSICAL EXAM:  GENERAL: NAD   HEAD:  Atraumatic, Normocephalic  EYES: EOMI, PERRLA, conjunctiva and sclera clear  NECK: Supple, No JVD  CHEST/LUNG: Clear to auscultation bilaterally; No wheeze  HEART: Regular rate and rhythm; No murmurs, rubs, or gallops  ABDOMEN: Soft, Nontender, Nondistended; Bowel sounds present +abd wound dressing c/d/i   EXTREMITIES:  2+ Peripheral Pulses, No clubbing, cyanosis, or edema  PSYCH: AAOx3  NEUROLOGY: non-focal  SKIN: No rashes or lesions    Labs Reviewed  Spoke to patient in regards to abnormal labs.    CBC Full  -  ( 09 Feb 2019 07:48 )  WBC Count : 5.82 K/uL  Hemoglobin : 8.1 g/dL  Hematocrit : 25.5 %  Platelet Count - Automated : 168 K/uL  Mean Cell Volume : 95.5 fL  Mean Cell Hemoglobin : 30.3 pg  Mean Cell Hemoglobin Concentration : 31.8 g/dL  Auto Neutrophil # : x  Auto Lymphocyte # : x  Auto Monocyte # : x  Auto Eosinophil # : x  Auto Basophil # : x  Auto Neutrophil % : x  Auto Lymphocyte % : x  Auto Monocyte % : x  Auto Eosinophil % : x  Auto Basophil % : x    BMP:    02-10 @ 07:43    Blood Urea Nitrogen - 38  Calcium - 8.2  Carbon Dioxide - 18  Chloride - 110  Creatinine - 2.6  Glucose - 86  Potassium - 5.1  Sodium - 138       MEDICATIONS  (STANDING):  amoxicillin  875 milliGRAM(s)/clavulanate 1 Tablet(s) Oral two times a day  chlorhexidine 4% Liquid 1 Application(s) Topical <User Schedule>  ferrous    sulfate 325 milliGRAM(s) Oral daily  finasteride 5 milliGRAM(s) Oral daily  heparin  Injectable 5000 Unit(s) SubCutaneous every 12 hours  sodium bicarbonate 650 milliGRAM(s) Oral three times a day  tamsulosin 0.4 milliGRAM(s) Oral at bedtime    MEDICATIONS  (PRN): Yes
